# Patient Record
Sex: FEMALE | Race: BLACK OR AFRICAN AMERICAN
[De-identification: names, ages, dates, MRNs, and addresses within clinical notes are randomized per-mention and may not be internally consistent; named-entity substitution may affect disease eponyms.]

---

## 2018-08-21 ENCOUNTER — HOSPITAL ENCOUNTER (EMERGENCY)
Dept: HOSPITAL 62 - ER | Age: 73
Discharge: HOME | End: 2018-08-21
Payer: COMMERCIAL

## 2018-08-21 VITALS — DIASTOLIC BLOOD PRESSURE: 73 MMHG | SYSTOLIC BLOOD PRESSURE: 146 MMHG

## 2018-08-21 DIAGNOSIS — I10: ICD-10-CM

## 2018-08-21 DIAGNOSIS — Z79.899: ICD-10-CM

## 2018-08-21 DIAGNOSIS — H93.13: Primary | ICD-10-CM

## 2018-08-21 DIAGNOSIS — R09.81: ICD-10-CM

## 2018-08-21 DIAGNOSIS — J34.89: ICD-10-CM

## 2018-08-21 DIAGNOSIS — R09.89: ICD-10-CM

## 2018-08-21 PROCEDURE — 99284 EMERGENCY DEPT VISIT MOD MDM: CPT

## 2018-08-21 PROCEDURE — 70450 CT HEAD/BRAIN W/O DYE: CPT

## 2018-08-21 NOTE — RADIOLOGY REPORT (SQ)
EXAM DESCRIPTION:  CT HEAD WITHOUT



COMPLETED DATE/TIME:  8/21/2018 6:15 pm



REASON FOR STUDY:  34; congestion, tinnitus



COMPARISON:  None.



TECHNIQUE:  Axial images acquired through the brain without intravenous contrast.  Images reviewed wi
th bone, brain and subdural windows.  Additional sagittal and coronal reconstructions were generated.
 Images stored on PACS.

All CT scanners at this facility use dose modulation, iterative reconstruction, and/or weight based d
osing when appropriate to reduce radiation dose to as low as reasonably achievable (ALARA).

[facility]: Dose Right  [facility]: CareDose    [facility]: Dose Right    [facility]: Teradose 4D    [facility]: Smart Save22



RADIATION DOSE:  CT Rad equipment meets quality standard of care and radiation dose reduction techniq
ues were employed. CTDIvol: 53.2 mGy. DLP: 937 mGy-cm. mGy.



LIMITATIONS:  None.



FINDINGS:  VENTRICLES: Normal size and contour.

CEREBRUM: No masses.  No hemorrhage.  No midline shift.  No evidence for acute infarction. Normal gra
y/white matter differentiation. No areas of low density in the white matter.

CEREBELLUM: No masses.  No hemorrhage.  No alteration of density.  No evidence for acute infarction.

EXTRAAXIAL SPACES: No fluid collections.  No masses.

ORBITS AND GLOBE: No intra- or extraconal masses.  Normal contour of globe without masses.

CALVARIUM: No fracture.

PARANASAL SINUSES: No fluid or mucosal thickening.

SOFT TISSUES: No mass or hematoma.

OTHER: No other significant finding.



IMPRESSION:  NORMAL BRAIN CT WITHOUT CONTRAST.

EVIDENCE OF ACUTE STROKE: NO.



COMMENT:  Quality ID # [id number]: Final reports with documentation of one or more dose reduction techniques
 (e.g., Automated exposure control, adjustment of the mA and/or kV according to patient size, use of 
iterative reconstruction technique)



TECHNICAL DOCUMENTATION:  JOB ID:  [id number]

 2011 [employer]- All Rights Reserved



Reading location - IP/workstation name: [email or web address]

## 2018-08-21 NOTE — ER DOCUMENT REPORT
ED General





- General


Chief Complaint: Ear Pain


Stated Complaint: RINGING IN EARS


Time Seen by Provider: 08/21/18 16:45


Information source: Patient


Notes: 





Patient is a 73-year-old female that presents today stating for around 3 weeks 

she has had some bilateral tinnitus in both ears.  She denies any headache, 

blurry vision, neck pain, weakness or numbness of the arms, gait disturbance, 

palpitations, or chest pain.  Patient does states she has had some intermittent 

runny nose and congestion.  She denies any fevers.  She denies any head trauma.

  She denies any new or change in medications.  


TRAVEL OUTSIDE OF THE [place] IN LAST 30 DAYS: No





- HPI


Onset: Other - See above


Onset/Duration: Gradual


Quality of pain: No pain


Severity: Mild


Pain Level: 1


Associated symptoms: Other - See above


Exacerbated by: Denies


Relieved by: Denies


Similar symptoms previously: No


Recently seen / treated by doctor: No





- Related Data


Allergies/Adverse Reactions: 


 





No Known Allergies Allergy (Verified 11/23/15 08:31)


 











Past Medical History





- General


Information source: Patient





- Social History


Smoking Status: Unknown if Ever Smoked


Cigarette use (# per day): No


Chew tobacco use (# tins/day): No


Smoking Education Provided: No


Frequency of alcohol use: None


Family History: Other


Patient has suicidal ideation: No


Patient has homicidal ideation: No





- Past Medical History


Cardiac Medical History: Reports: Hx Hypertension - 1996 meds/HT crisis 1990


   Denies: Hx Coronary Artery Disease, Hx Heart Attack


Pulmonary Medical History: Reports: Hx Bronchitis - 2 yrs ago


   Denies: Hx Asthma, Hx COPD, Hx Pneumonia


Neurological Medical History: Denies: Hx Cerebrovascular Accident, Hx Seizures


Renal/ Medical History: Denies: Hx Peritoneal Dialysis


GI Medical History: Denies: Hx Hepatitis, Hx Hiatal Hernia, Hx Ulcer


Musculoskeletal Medical History: Reports Hx Arthritis - osteo/legs


Infectious Medical History: Denies: Hx Hepatitis


Past Surgical History: Reports: Hx Cholecystectomy, Hx Hysterectomy.  Denies: 

Hx Mastectomy, Hx Open Heart Surgery, Hx Pacemaker





- Immunizations


Hx Diphtheria, Pertussis, Tetanus Vaccination: Yes


Hx Pneumococcal Vaccination: 01/01/12





Review of Systems





- Review of Systems


Constitutional: denies: Fever


EENT: Nose congestion, Nose discharge.  denies: Eye discharge


Cardiovascular: denies: Chest pain, Palpitations


Respiratory: denies: Short of breath


Gastrointestinal: denies: Vomiting


Genitourinary: denies: Dysuria


Musculoskeletal: denies: Leg swelling


Skin: Other - no hives.  denies: Rash


Neurological/Psychological: Other - no slurred speech


-: Yes All other systems reviewed and negative





Physical Exam





- Vital signs


Vitals: 


 











Temp Pulse Resp BP Pulse Ox


 


 98.7 F   58 L  16   146/73 H  98 


 


 08/21/18 15:49  08/21/18 15:49  08/21/18 15:49  08/21/18 15:49  08/21/18 15:49











Notes: 





Reviewed vital signs and nursing note as charted by RN.





CONSTITUTIONAL: Alert and oriented and responds appropriately to questions. Well

-appearing; well-nourished





HEAD: Normocephalic; atraumatic





EYES: PERRL; full extraocular range of motion





ENT: Normal nose; bilateral nonpurulent nasal rhinorrhea; no sinus swelling, 

erythema, bogginess; patient has small effusion behind bilateral ears with no 

obvious signs of bulging, erythema or infection; moist mucous membranes; 

pharynx without lesions noted





NECK: Supple without meningismus; non-tender; no carotid bruit; no cervical 

lymphadenopathy, no masses





CARD: Regular rate and rhythm; no murmurs; symmetric distal pulses





RESP: Normal chest excursion without splinting or tachypnea; breath sounds 

clear and equal bilaterally





ABD/GI: Normal bowel sounds; non-distended; soft, non-tender





EXT: No edema





SKIN: No acute lesions noted





NEURO: CN II through XII are intact.  Patient has 5 out of 5 bilateral upper 

and lower sensation intact to light touch.  Normal cerebellar examination





PSYCH: The patient's mood and manner are appropriate. Grooming and personal 

hygiene are appropriate.





Course





- Re-evaluation


Re-evalutation: 





08/21/18 16:59


Given the above history and physical examination I will order a noncontrast CT 

scan of the head.  I do believe acute bacterial meningitis, acute CVA, acute 

intracranial bleed, all to be extremely unlikely.  Patient denies eating a lot 

of aspirin tablets.  





Given the frequent nasal congestion with some mild tinnitus with no focal 

neurological deficits, if the image is unremarkable, I would discharge the 

patient home with strict return precautions and follow-up with ENT.





08/21/18 18:32


CT scan of the head shows no obvious acute abnormalities.  Given the above 

history and physical examination, patient will be discharged home with strict 

return precautions and follow-up with primary care physician and ENT.











- Vital Signs


Vital signs: 


 











Temp Pulse Resp BP Pulse Ox


 


 98.7 F   58 L  16   146/73 H  98 


 


 08/21/18 15:49  08/21/18 15:49  08/21/18 15:49  08/21/18 15:49  08/21/18 15:49














Discharge





- Discharge


Clinical Impression: 


 Rhinorrhea





Tinnitus


Qualifiers:


 Laterality: bilateral Qualified Code(s): H93.13 - Tinnitus, bilateral





Condition: Good


Disposition: HOME, SELF-CARE


Additional Instructions: 


Come back immediately for any increased ringing in her ear, fever, vomiting, 

weakness or numbness, blurry vision, headaches, or any other acute problems.  

Please follow-up with your primary care physician and hopefully ENT as we have 

discussed.


Referrals: 


HOLGER SKAGGS MD [Primary Care Provider] - Follow up as needed


JOSEPHINE RONDON DO [ASSOCIATE] - Follow up as needed

## 2018-12-13 ENCOUNTER — HOSPITAL ENCOUNTER (OUTPATIENT)
Dept: HOSPITAL 62 - WI | Age: 73
End: 2018-12-13
Attending: INTERNAL MEDICINE
Payer: COMMERCIAL

## 2018-12-13 DIAGNOSIS — Z12.31: Primary | ICD-10-CM

## 2018-12-13 PROCEDURE — 77063 BREAST TOMOSYNTHESIS BI: CPT

## 2018-12-13 PROCEDURE — 77067 SCR MAMMO BI INCL CAD: CPT

## 2018-12-13 NOTE — WOMENS IMAGING REPORT
EXAM DESCRIPTION:  3D SCREENING MAMMO BILAT



COMPLETED DATE/TIME:  12/13/2018 1:21 pm



REASON FOR STUDY:  SCREENING MAMMO Z12.31  ENCNTR SCREEN MAMMOGRAM FOR MALIGNANT NEOPLASM OF FLORES



COMPARISON:  [id number]



TECHNIQUE:  Standard craniocaudal and mediolateral oblique views of each breast recorded using digita
l acquisition and breast tomosynthesis.



LIMITATIONS:  None.



FINDINGS:  RIGHT BREAST

MASSES: No suspicious masses.

CALCIFICATIONS: No new or suspicious calcifications.

ARCHITECTURAL DISTORTION: Developing density with architectural distortion lateral subareolar right b
reast 2 cm deep to the nipple.

DEVELOPING DENSITY: See above.

ASYMMETRY: None noted.

OTHER: No other significant findings.

LEFT BREAST

MASSES: No suspicious masses.

CALCIFICATIONS: No new or suspicious calcifications.

ARCHITECTURAL DISTORTION: None.

DEVELOPING DENSITY: None.

ASYMMETRY: None noted.

OTHER: No other significant findings.

Read with the assistance of CAD.

.[facility] - R2 Cenova Version 1.3

.[facility] - R2 Cenova Version 1.3

.[facility] - R2 Cenova Version 2.4

.[facility] - R2 Cenova Version 2.4

.[facility] - R2  Version 9.2



IMPRESSION:  Developing density right breast.



BREAST DENSITY:  b. There are scattered areas of fibroglandular density.



BIRAD:  0 Incomplete:  Needs Additional Imaging Evaluation and/or prior Mammograms for Comparison.



RECOMMENDATION:  RECOMMENDED FOLLOW-UP: True lateral cone compression views and potential ultrasound 
of the right breast.

The patient will be contacted for additional imaging.



COMMENT:  The patient has been notified of the results by letter per SA requirements. Additional no
tification policies are in place for contacting patient with suspicious or incomplete findings.

Quality ID #225: The American College of Radiology recommends an annual screening mammogram for women
 aged 40 years or over. This facility utilizes a reminder system to ensure that all patients receive 
reminder letters, and/or direct phone calls for appointments. This includes reminders for routine scr
eening mammograms, diagnostic mammograms, or other Breast Imaging Interventions when appropriate.  Th
is patient will be placed in the appropriate reminder system.

The American College of Radiology (ACR) has developed recommendations for screening MRI of the breast
s in certain patient populations, to be used in conjunction with mammography.  Breast MRI surveillanc
e may be appropriate for women with more than 20% lifetime risk of developing breast cancer  as deter
mined by genetic testing, significant family history of the disease, or history of mantle radiation f
or Hodgkins Disease.  ACR Practice Guidelines 2008.

[employer]



PQRS 6045F: Fluoroscopic imaging is not utilized for breast tomosynthesis.



TECHNICAL DOCUMENTATION:  FINDING NUMBER: (1)

ASSESSMENT: (1)

JOB ID:  [id number]

 2011 [employer]- All Rights Reserved



Reading location - IP/workstation name: [facility]

## 2018-12-28 ENCOUNTER — HOSPITAL ENCOUNTER (OUTPATIENT)
Dept: HOSPITAL 62 - WI | Age: 73
End: 2018-12-28
Attending: INTERNAL MEDICINE
Payer: COMMERCIAL

## 2018-12-28 DIAGNOSIS — N60.01: Primary | ICD-10-CM

## 2018-12-28 PROCEDURE — 76642 ULTRASOUND BREAST LIMITED: CPT

## 2018-12-28 NOTE — WOMENS IMAGING REPORT
EXAM DESCRIPTION:  RIGHT DIAGNOSTIC MAMMO W/CAD; U/S BREAST UNILAT LIMITED



COMPLETED DATE/TIME:  12/28/2018 8:02 am; 12/28/2018 8:42 am



REASON FOR STUDY:  INCONCLUSSIVE MAMMOGRAM;R92.2; RT BREAST R92.2 R92.2  INCONCLUSIVE MAMMOGRAM



COMPARISON:  12/13/2018



TECHNIQUE:  True lateral and cone compression views.



LIMITATIONS:  None.



FINDINGS:  BREAST: right

MASSES: No suspicious masses.

CALCIFICATIONS: No new or suspicious calcifications.

ARCHITECTURAL DISTORTION: None.

DEVELOPING DENSITY: None.

ASYMMETRY: None noted.

OTHER: No other significant findings.

Ultrasound of the right breast demonstrates 6 mm cyst subareolar 9 o'clock.



IMPRESSION:  Benign findings.



BREAST DENSITY:  b. There are scattered areas of fibroglandular density.



BIRAD:  2 Benign findings.



RECOMMENDATION:  RECOMMENDED FOLLOW UP: Birads 1 or 2: The patient should resume routine screening .

SPECIFIC INTERVENTION/IMAGING/CONSULTATION RECOMMENDED:No additional intervention/ imaging/consultati
on needed at this time.

COMMUNICATION:The imaging findings were not discussed with the patient. Her referring provider has be
en notified of the findings.



COMMENT:  The patient has been notified of the results by letter per SA requirements. Additional no
tification policies are in place for contacting patient with suspicious or incomplete findings.

Quality ID #225: The American College of Radiology recommends an annual screening mammogram for women
 aged 40 years or over. This facility utilizes a reminder system to ensure that all patients receive 
reminder letters, and/or direct phone calls for appointments. This includes reminders for routine scr
eening mammograms, diagnostic mammograms, or other Breast Imaging Interventions when appropriate.  Th
is patient will be placed in the appropriate reminder system.

The American College of Radiology (ACR) has developed recommendations for screening MRI of the breast
s in certain patient populations, to be used in conjunction with mammography.  Breast MRI surveillanc
e may be appropriate for women with more than 20% lifetime risk of developing breast cancer  as deter
mined by genetic testing, significant family history of the disease, or history of mantle radiation f
or Hodgkins Disease.  ACR Practice Guidelines 2008.



TECHNICAL DOCUMENTATION:  FINDING NUMBER: (1)

ASSESSMENT: (1)

JOB ID:  [id number]

 2011 [employer] All Rights Reserved



Reading location - IP/workstation name: [email or web address] [id number]

## 2018-12-28 NOTE — WOMENS IMAGING REPORT
EXAM DESCRIPTION:  RIGHT DIAGNOSTIC MAMMO W/CAD; U/S BREAST UNILAT LIMITED



COMPLETED DATE/TIME:  12/28/2018 8:02 am; 12/28/2018 8:42 am



REASON FOR STUDY:  INCONCLUSSIVE MAMMOGRAM;R92.2; RT BREAST R92.2 R92.2  INCONCLUSIVE MAMMOGRAM



COMPARISON:  12/13/2018



TECHNIQUE:  True lateral and cone compression views.



LIMITATIONS:  None.



FINDINGS:  BREAST: right

MASSES: No suspicious masses.

CALCIFICATIONS: No new or suspicious calcifications.

ARCHITECTURAL DISTORTION: None.

DEVELOPING DENSITY: None.

ASYMMETRY: None noted.

OTHER: No other significant findings.

Ultrasound of the right breast demonstrates 6 mm cyst subareolar 9 o'clock.



IMPRESSION:  Benign findings.



BREAST DENSITY:  b. There are scattered areas of fibroglandular density.



BIRAD:  2 Benign findings.



RECOMMENDATION:  RECOMMENDED FOLLOW UP: Birads 1 or 2: The patient should resume routine screening .

SPECIFIC INTERVENTION/IMAGING/CONSULTATION RECOMMENDED:No additional intervention/ imaging/consultati
on needed at this time.

COMMUNICATION:The imaging findings were not discussed with the patient. Her referring provider has be
en notified of the findings.



COMMENT:  The patient has been notified of the results by letter per SA requirements. Additional no
tification policies are in place for contacting patient with suspicious or incomplete findings.

Quality ID #225: The American College of Radiology recommends an annual screening mammogram for women
 aged 40 years or over. This facility utilizes a reminder system to ensure that all patients receive 
reminder letters, and/or direct phone calls for appointments. This includes reminders for routine scr
eening mammograms, diagnostic mammograms, or other Breast Imaging Interventions when appropriate.  Th
is patient will be placed in the appropriate reminder system.

The American College of Radiology (ACR) has developed recommendations for screening MRI of the breast
s in certain patient populations, to be used in conjunction with mammography.  Breast MRI surveillanc
e may be appropriate for women with more than 20% lifetime risk of developing breast cancer  as deter
mined by genetic testing, significant family history of the disease, or history of mantle radiation f
or Hodgkins Disease.  ACR Practice Guidelines 2008.



TECHNICAL DOCUMENTATION:  FINDING NUMBER: (1)

ASSESSMENT: (1)

JOB ID:  [id number]

 2011 [employer]- All Rights Reserved



Reading location - IP/workstation name: [email or web address] [id number]

## 2020-01-15 ENCOUNTER — HOSPITAL ENCOUNTER (OUTPATIENT)
Dept: HOSPITAL 62 - WI | Age: 75
End: 2020-01-15
Attending: INTERNAL MEDICINE
Payer: COMMERCIAL

## 2020-01-15 ENCOUNTER — HOSPITAL ENCOUNTER (OUTPATIENT)
Dept: HOSPITAL 62 - OD | Age: 75
End: 2020-01-15
Attending: INTERNAL MEDICINE
Payer: COMMERCIAL

## 2020-01-15 DIAGNOSIS — Z12.31: Primary | ICD-10-CM

## 2020-01-15 DIAGNOSIS — N18.3: ICD-10-CM

## 2020-01-15 DIAGNOSIS — I12.9: Primary | ICD-10-CM

## 2020-01-15 LAB
ADD MANUAL DIFF: NO
ALBUMIN SERPL-MCNC: 4.1 G/DL (ref 3.5–5)
ALP SERPL-CCNC: 51 U/L (ref 38–126)
ANION GAP SERPL CALC-SCNC: 10 MMOL/L (ref 5–19)
APPEARANCE UR: (no result)
APTT PPP: YELLOW S
AST SERPL-CCNC: 21 U/L (ref 14–36)
BASOPHILS # BLD AUTO: 0 10^3/UL (ref 0–0.2)
BASOPHILS NFR BLD AUTO: 0.4 % (ref 0–2)
BILIRUB DIRECT SERPL-MCNC: 0.4 MG/DL (ref 0–0.4)
BILIRUB SERPL-MCNC: 0.6 MG/DL (ref 0.2–1.3)
BILIRUB UR QL STRIP: NEGATIVE
BUN SERPL-MCNC: 34 MG/DL (ref 7–20)
CALCIUM: 9.4 MG/DL (ref 8.4–10.2)
CHLORIDE SERPL-SCNC: 98 MMOL/L (ref 98–107)
CHOLEST SERPL-MCNC: 278.83 MG/DL (ref 0–200)
CO2 SERPL-SCNC: 32 MMOL/L (ref 22–30)
EOSINOPHIL # BLD AUTO: 0.3 10^3/UL (ref 0–0.6)
EOSINOPHIL NFR BLD AUTO: 4.6 % (ref 0–6)
ERYTHROCYTE [DISTWIDTH] IN BLOOD BY AUTOMATED COUNT: 13.1 % (ref 11.5–14)
GLUCOSE SERPL-MCNC: 105 MG/DL (ref 75–110)
GLUCOSE UR STRIP-MCNC: NEGATIVE MG/DL
HCT VFR BLD CALC: 38.8 % (ref 36–47)
HGB BLD-MCNC: 13.3 G/DL (ref 12–15.5)
KETONES UR STRIP-MCNC: NEGATIVE MG/DL
LDLC SERPL DIRECT ASSAY-MCNC: 198 MG/DL (ref ?–100)
LYMPHOCYTES # BLD AUTO: 1.8 10^3/UL (ref 0.5–4.7)
LYMPHOCYTES NFR BLD AUTO: 27.1 % (ref 13–45)
MCH RBC QN AUTO: 31.3 PG (ref 27–33.4)
MCHC RBC AUTO-ENTMCNC: 34.3 G/DL (ref 32–36)
MCV RBC AUTO: 91 FL (ref 80–97)
MONOCYTES # BLD AUTO: 0.7 10^3/UL (ref 0.1–1.4)
MONOCYTES NFR BLD AUTO: 10.6 % (ref 3–13)
NEUTROPHILS # BLD AUTO: 3.7 10^3/UL (ref 1.7–8.2)
NEUTS SEG NFR BLD AUTO: 57.3 % (ref 42–78)
NITRITE UR QL STRIP: NEGATIVE
PH UR STRIP: 5 [PH] (ref 5–9)
PLATELET # BLD: 307 10^3/UL (ref 150–450)
POTASSIUM SERPL-SCNC: 3.6 MMOL/L (ref 3.6–5)
PROT SERPL-MCNC: 7.5 G/DL (ref 6.3–8.2)
PROT UR STRIP-MCNC: 30 MG/DL
RBC # BLD AUTO: 4.25 10^6/UL (ref 3.72–5.28)
SP GR UR STRIP: 1.02
TOTAL CELLS COUNTED % (AUTO): 100 %
TRIGL SERPL-MCNC: 187 MG/DL (ref ?–150)
URATE SERPL-MCNC: 10.1 MG/DL (ref 2.5–7.5)
UROBILINOGEN UR-MCNC: NEGATIVE MG/DL (ref ?–2)
VLDLC SERPL CALC-MCNC: 37.4 MG/DL (ref 10–31)
WBC # BLD AUTO: 6.5 10^3/UL (ref 4–10.5)

## 2020-01-15 PROCEDURE — 82043 UR ALBUMIN QUANTITATIVE: CPT

## 2020-01-15 PROCEDURE — 82570 ASSAY OF URINE CREATININE: CPT

## 2020-01-15 PROCEDURE — 86320 SERUM IMMUNOELECTROPHORESIS: CPT

## 2020-01-15 PROCEDURE — 36415 COLL VENOUS BLD VENIPUNCTURE: CPT

## 2020-01-15 PROCEDURE — 80053 COMPREHEN METABOLIC PANEL: CPT

## 2020-01-15 PROCEDURE — 84436 ASSAY OF TOTAL THYROXINE: CPT

## 2020-01-15 PROCEDURE — 77063 BREAST TOMOSYNTHESIS BI: CPT

## 2020-01-15 PROCEDURE — 84156 ASSAY OF PROTEIN URINE: CPT

## 2020-01-15 PROCEDURE — 84550 ASSAY OF BLOOD/URIC ACID: CPT

## 2020-01-15 PROCEDURE — 84166 PROTEIN E-PHORESIS/URINE/CSF: CPT

## 2020-01-15 PROCEDURE — 80061 LIPID PANEL: CPT

## 2020-01-15 PROCEDURE — 81001 URINALYSIS AUTO W/SCOPE: CPT

## 2020-01-15 PROCEDURE — 77067 SCR MAMMO BI INCL CAD: CPT

## 2020-01-15 PROCEDURE — 85025 COMPLETE CBC W/AUTO DIFF WBC: CPT

## 2020-01-15 PROCEDURE — 84443 ASSAY THYROID STIM HORMONE: CPT

## 2020-01-15 NOTE — WOMENS IMAGING REPORT
EXAM DESCRIPTION:  3D SCREENING MAMMO BILAT



COMPLETED DATE/TIME:  1/15/2020 9:12 am



REASON FOR STUDY:  Z12.31 SCREENING MAMMO Z12.31  ENCNTR SCREEN MAMMOGRAM FOR MALIGNANT NEOPLASM OF B
RE



COMPARISON:  Multiple since 2009



EXAM PARAMETERS:  Views: Standard craniocaudal and mediolateral oblique views of each breast recorded
 using digital acquisition and breast tomosynthesis.

Read with the assistance of CAD.

.[facility] [employer]  Version 9.2



LIMITATIONS:  None.



FINDINGS:  No suspicious masses, suspicious calcifications or architectural distortion. No areas of c
oncern.



IMPRESSION:   NEGATIVE MAMMOGRAM. BIRADS 1.



BREAST DENSITY:  c. The breasts are heterogeneously dense, which may obscure small masses.



BIRAD:  ASSESSMENT:  1 NEGATIVE



RECOMMENDATION:  ROUTINE SCREENING

Please continue yearly bilateral screening mammography/tomosynthesis in January 2021



COMMENT:  The patient has been notified of the results by letter per MQSA requirements. Additional no
tification policies are in place for contacting patient with suspicious or incomplete findings.

Quality ID #225: The American College of Radiology recommends an annual screening mammogram for women
 aged 40 years or over. This facility utilizes a reminder system to ensure that all patients receive 
reminder letters, and/or direct phone calls for appointments. This includes reminders for routine scr
eening mammograms, diagnostic mammograms, or other Breast Imaging Interventions when appropriate.  Th
is patient will be placed in the appropriate reminder system.



TECHNICAL DOCUMENTATION:  FINDING NUMBER: (1)

ASSESSMENT:  (1)

JOB ID:  [id number]

 2011 [employer]- All Rights Reserved



Reading location - IP/workstation name: [email or web address]

## 2020-01-16 LAB
CREAT UR-MCNC: 152.1 MG/DL
MICROALBUMIN UR-MCNC: 58.1 UG/ML

## 2020-01-17 LAB
CREAT UR-MCNC: 176.1 MG/DL (ref 15–278)
PROT UR STRIP-MCNC: 10.8 MG/DL (ref ?–12)
UR PRO/CREAT RATIO RESULT: 0.1 MG/MG (ref 0–0.2)

## 2020-01-18 ENCOUNTER — HOSPITAL ENCOUNTER (EMERGENCY)
Dept: HOSPITAL 62 - ER | Age: 75
Discharge: HOME | End: 2020-01-18
Payer: COMMERCIAL

## 2020-01-18 VITALS — DIASTOLIC BLOOD PRESSURE: 74 MMHG | SYSTOLIC BLOOD PRESSURE: 142 MMHG

## 2020-01-18 DIAGNOSIS — Z90.710: ICD-10-CM

## 2020-01-18 DIAGNOSIS — Z90.49: ICD-10-CM

## 2020-01-18 DIAGNOSIS — M79.672: Primary | ICD-10-CM

## 2020-01-18 DIAGNOSIS — I10: ICD-10-CM

## 2020-01-18 DIAGNOSIS — M10.9: ICD-10-CM

## 2020-01-18 PROCEDURE — 99283 EMERGENCY DEPT VISIT LOW MDM: CPT

## 2020-01-18 NOTE — ER DOCUMENT REPORT
HPI





- HPI


Patient complains to provider of: Left foot pain


Time Seen by Provider: 01/18/20 12:32


Onset: Yesterday


Onset/Duration: Gradual


Quality of pain: Achy


Pain Level: 5


Context: 





Patient presents complaining of left foot pain that started yesterday.  Patient 

denies any injury.  Patient does work long shifts which requires standing.


Associated Symptoms: denies: Fever


Exacerbated by: Standing, Movement, Walking


Relieved by: Denies


Similar symptoms previously: No


Recently seen / treated by doctor: No





- ROS


ROS below otherwise negative: Yes


Systems Reviewed and Negative: Yes All other systems reviewed and negative





- CONSTITUTIONAL


Constitutional: DENIES: Fever, Chills





- GASTROINTESTINAL


Gastrointestinal: DENIES: Nausea





- REPRODUCTIVE


Reproductive: DENIES: Pregnant:





- MUSCULOSKELETAL


Musculoskeletal: REPORTS: Extremity pain, Swelling





- DERM


Skin Color: Normal


Skin Problems: None





Past Medical History





- General


Information source: Patient





- Social History


Smoking Status: Never Smoker


Chew tobacco use (# tins/day): No


Frequency of alcohol use: None


Drug Abuse: None


Occupation: Retail


Family History: Reviewed & Not Pertinent, Other


Patient has suicidal ideation: No


Patient has homicidal ideation: No





- Past Medical History


Cardiac Medical History: Reports: Hx Hypertension - 1996 meds/HT crisis 1990


Pulmonary Medical History: Reports: Hx Bronchitis - 2 yrs ago


Neurological Medical History: Denies: Hx Cerebrovascular Accident, Hx Seizures


Endocrine Medical History: Denies: Hx Diabetes Mellitus Type 1, Hx Diabetes 

Mellitus Type 2


Renal/ Medical History: Denies: Hx Peritoneal Dialysis


GI Medical History: Denies: Hx Hepatitis, Hx Hiatal Hernia, Hx Ulcer


Musculoskeletal Medical History: Reports Hx Arthritis - osteo/legs, Reports Hx 

Gout


Infectious Medical History: Denies: Hx Hepatitis


Past Surgical History: Reports: Hx Cholecystectomy, Hx Hysterectomy





- Immunizations


Hx Diphtheria, Pertussis, Tetanus Vaccination: Yes


Hx Pneumococcal Vaccination: 01/01/12





Vertical Provider Document





- CONSTITUTIONAL


Agree With Documented VS: Yes


Exam Limitations: No Limitations


General Appearance: WD/WN, No Apparent Distress





- INFECTION CONTROL


TRAVEL OUTSIDE OF THE [place] IN LAST 30 DAYS: No





- HEENT


HEENT: Atraumatic, Normocephalic





- NECK


Neck: Normal Inspection, Supple.  negative: Lymphadenopathy-Left, 

Lymphadenopathy-Right





- RESPIRATORY


Respiratory: Breath Sounds Normal, No Respiratory Distress





- CARDIOVASCULAR


Cardiovascular: Regular Rate, Regular Rhythm


Pulses: Normal: Dorsalis pedis





- MUSCULOSKELETAL/EXTREMETIES


Musculoskeletal/Extremeties: MAEW, Tender - left midfoot tenderness with mild 

calor, no obvious erythema, Edema - 1+





- NEURO


Level of Consciousness: Awake, Alert, Appropriate


Motor/Sensory: No Motor Deficit





- DERM


Integumentary: Warm, Dry, No Rash





Course





- Re-evaluation


Re-evalutation: 





01/18/20 14:01


Patient's x-ray report reviewed, no concern for any fracture or occult injury.  

Patient with mild calor and swelling to left midfoot area.  Area is tender with 

light palpation.  Patient with symptoms worrisome for early gout flareup.  

Patient educated on low purine diet.  Good return precautions discussed with 

patient.


01/18/20 14:02








- Vital Signs


Vital signs: 


                                        











Temp Pulse Resp BP Pulse Ox


 


 98.0 F   68   18   146/76 H  98 


 


 01/18/20 12:22  01/18/20 12:22  01/18/20 12:22  01/18/20 12:22  01/18/20 12:22














- Diagnostic Test


Radiology reviewed: Image reviewed, Reports reviewed





Discharge





- Discharge


Clinical Impression: 


 Left foot pain





Gout


Qualifiers:


 Gout site: unspecified site Gout etiology: unspecified cause Chronicity: acute 

Qualified Code(s): M10.9 - Gout, unspecified





Condition: Stable


Disposition: HOME, SELF-CARE


Instructions:  Gout (OM), Gout Diet (OM), Steroid Medication, Ultram (OM)


Additional Instructions: 


Return immediately for any new or worsening symptoms





Followup with your primary care provider, call tomorrow to make a followup 

appointment





Eat foods low in purine


Prescriptions: 


Prednisone [Deltasone 20 mg Tablet] 2 tab PO DAILY 5 Days  tablet


Walker [Folding Walker] 1 each MC ASDIR PRN #1 each


 PRN Reason: 


Tramadol HCl [Ultram 50 mg Tablet] 50 mg PO ASDIR PRN #15 tablet


 PRN Reason: 


Forms:  Return to Work


Referrals: 


HOLGER SKAGGS MD [Primary Care Provider] - Follow up as needed

## 2020-01-18 NOTE — RADIOLOGY REPORT (SQ)
EXAM DESCRIPTION:  FOOT LEFT COMPLETE



COMPLETED DATE/TIME:  1/18/2020 12:55 pm



REASON FOR STUDY:  left foot pain   .  Pain on top of the left foot.



COMPARISON:  None.



NUMBER OF VIEWS:  Three views.



TECHNIQUE:  AP, lateral and oblique  radiographic images acquired of the left foot.



LIMITATIONS:  None.



FINDINGS:  MINERALIZATION: Normal.

BONES: No acute fracture or dislocation. Bony spur noted at the plantar aspect of the calcaneus.  Deg
enerative changes at the midfoot.  Degenerative changes are also noted at the 1st metatarso-phalangea
l joint.

SOFT TISSUES: No soft tissue swelling.  No radiopaque foreign body.



IMPRESSION:  No radiographic evidence for acute fracture at the left foot.  Degenerative changes.



TECHNICAL DOCUMENTATION:  JOB ID:  [id number]

[id number]

 2011 [employer]- All Rights Reserved



Reading location - IP/workstation name: [email or web address]

## 2020-01-20 LAB
ALBUMIN 3: 3.6 G/DL (ref 2.9–4.4)
ALBUMIN UR: 33.5 %
ALBUMIN/GLOB SERPL: 1.2 {RATIO} (ref 0.7–1.7)
ALPHA-1-GLOBULIN URINE: 4.6 %
ALPHA-2-GLOBULIN URINE: 12.7 %
ALPHA1 GLOB SERPL ELPH-MCNC: 0.2 G/DL (ref 0–0.4)
B-GLOBULIN SERPL ELPH-MCNC: 1.1 G/DL (ref 0.7–1.3)
GAMMA GLOB 24H MFR UR ELPH: 18 %
GAMMA GLOBULINS: 1.1 G/DL (ref 0.4–1.8)
IGA SERPL-MCNC: 255 MG/DL (ref 64–422)
IGG SERPL-MCNC: 1301 MG/DL (ref 700–1600)
IGM SERPL-MCNC: 88 MG/DL (ref 26–217)
PROT SERPL-MCNC: 6.7 G/DL (ref 6–8.5)
PROTEIN TOTAL URINE: 10.3 MG/DL

## 2020-05-30 ENCOUNTER — HOSPITAL ENCOUNTER (EMERGENCY)
Dept: HOSPITAL 62 - ER | Age: 75
Discharge: HOME | End: 2020-05-30
Payer: COMMERCIAL

## 2020-05-30 VITALS — DIASTOLIC BLOOD PRESSURE: 81 MMHG | SYSTOLIC BLOOD PRESSURE: 150 MMHG

## 2020-05-30 DIAGNOSIS — M25.461: ICD-10-CM

## 2020-05-30 DIAGNOSIS — M17.11: ICD-10-CM

## 2020-05-30 DIAGNOSIS — N20.1: Primary | ICD-10-CM

## 2020-05-30 DIAGNOSIS — M54.5: ICD-10-CM

## 2020-05-30 DIAGNOSIS — M25.561: ICD-10-CM

## 2020-05-30 DIAGNOSIS — I10: ICD-10-CM

## 2020-05-30 LAB
ADD MANUAL DIFF: NO
ALBUMIN SERPL-MCNC: 4.4 G/DL (ref 3.5–5)
ALP SERPL-CCNC: 54 U/L (ref 38–126)
ANION GAP SERPL CALC-SCNC: 9 MMOL/L (ref 5–19)
APPEARANCE UR: CLEAR
APTT PPP: (no result) S
AST SERPL-CCNC: 22 U/L (ref 14–36)
BASOPHILS # BLD AUTO: 0.1 10^3/UL (ref 0–0.2)
BASOPHILS NFR BLD AUTO: 1.4 % (ref 0–2)
BILIRUB DIRECT SERPL-MCNC: 0 MG/DL (ref 0–0.4)
BILIRUB SERPL-MCNC: 0.7 MG/DL (ref 0.2–1.3)
BILIRUB UR QL STRIP: NEGATIVE
BUN SERPL-MCNC: 29 MG/DL (ref 7–20)
CALCIUM: 9.6 MG/DL (ref 8.4–10.2)
CHLORIDE SERPL-SCNC: 103 MMOL/L (ref 98–107)
CO2 SERPL-SCNC: 26 MMOL/L (ref 22–30)
EOSINOPHIL # BLD AUTO: 0 10^3/UL (ref 0–0.6)
EOSINOPHIL NFR BLD AUTO: 0.5 % (ref 0–6)
ERYTHROCYTE [DISTWIDTH] IN BLOOD BY AUTOMATED COUNT: 13.7 % (ref 11.5–14)
GLUCOSE SERPL-MCNC: 102 MG/DL (ref 75–110)
GLUCOSE UR STRIP-MCNC: NEGATIVE MG/DL
HCT VFR BLD CALC: 38.1 % (ref 36–47)
HGB BLD-MCNC: 12.7 G/DL (ref 12–15.5)
KETONES UR STRIP-MCNC: NEGATIVE MG/DL
LYMPHOCYTES # BLD AUTO: 1.1 10^3/UL (ref 0.5–4.7)
LYMPHOCYTES NFR BLD AUTO: 14.3 % (ref 13–45)
MCH RBC QN AUTO: 31.3 PG (ref 27–33.4)
MCHC RBC AUTO-ENTMCNC: 33.3 G/DL (ref 32–36)
MCV RBC AUTO: 94 FL (ref 80–97)
MONOCYTES # BLD AUTO: 0.6 10^3/UL (ref 0.1–1.4)
MONOCYTES NFR BLD AUTO: 7.8 % (ref 3–13)
NEUTROPHILS # BLD AUTO: 5.8 10^3/UL (ref 1.7–8.2)
NEUTS SEG NFR BLD AUTO: 76 % (ref 42–78)
NITRITE UR QL STRIP: POSITIVE
PH UR STRIP: 5 [PH] (ref 5–9)
PLATELET # BLD: 249 10^3/UL (ref 150–450)
POTASSIUM SERPL-SCNC: 4.1 MMOL/L (ref 3.6–5)
PROT SERPL-MCNC: 7.6 G/DL (ref 6.3–8.2)
PROT UR STRIP-MCNC: NEGATIVE MG/DL
RBC # BLD AUTO: 4.04 10^6/UL (ref 3.72–5.28)
SP GR UR STRIP: 1.01
TOTAL CELLS COUNTED % (AUTO): 100 %
URATE SERPL-MCNC: 5.3 MG/DL (ref 2.5–7.5)
UROBILINOGEN UR-MCNC: 2 MG/DL (ref ?–2)
WBC # BLD AUTO: 7.6 10^3/UL (ref 4–10.5)

## 2020-05-30 PROCEDURE — 80053 COMPREHEN METABOLIC PANEL: CPT

## 2020-05-30 PROCEDURE — 81001 URINALYSIS AUTO W/SCOPE: CPT

## 2020-05-30 PROCEDURE — 73564 X-RAY EXAM KNEE 4 OR MORE: CPT

## 2020-05-30 PROCEDURE — 74176 CT ABD & PELVIS W/O CONTRAST: CPT

## 2020-05-30 PROCEDURE — 84550 ASSAY OF BLOOD/URIC ACID: CPT

## 2020-05-30 PROCEDURE — 99284 EMERGENCY DEPT VISIT MOD MDM: CPT

## 2020-05-30 PROCEDURE — 96361 HYDRATE IV INFUSION ADD-ON: CPT

## 2020-05-30 PROCEDURE — 85025 COMPLETE CBC W/AUTO DIFF WBC: CPT

## 2020-05-30 PROCEDURE — 96365 THER/PROPH/DIAG IV INF INIT: CPT

## 2020-05-30 PROCEDURE — 36415 COLL VENOUS BLD VENIPUNCTURE: CPT

## 2020-05-30 NOTE — RADIOLOGY REPORT (SQ)
EXAM DESCRIPTION:  CT ABD/PELVIS NO ORAL OR IV



IMAGES COMPLETED DATE/TIME:  5/30/2020 4:33 pm



REASON FOR STUDY:  left flank pain



COMPARISON:  CT abdomen pelvis 2/11/2012



TECHNIQUE:  CT scan of the abdomen and pelvis performed without intravenous or oral contrast. Images 
reviewed with lung, soft tissue, and bone windows. Reconstructed coronal and sagittal MPR images revi
ewed. All images stored on PACS.

All CT scanners at this facility use dose modulation, iterative reconstruction, and/or weight based d
osing when appropriate to reduce radiation dose to as low as reasonably achievable (ALARA).

[facility]: Dose Right  [facility]: CareDose    [facility]: Dose Right    [facility]: Teradose 4D    [facility]: Smart Offermatica



RADIATION DOSE:  CT Rad equipment meets quality standard of care and radiation dose reduction techniq
ues were employed. CTDIvol: 12.1 mGy. DLP: 547 mGy-cm.mGy.



LIMITATIONS:  None.



FINDINGS:  On coronal image 45, a 3 mm calcification is present at the left ureteral pelvic junction 
worrisome for obstructive stone.  No other left-sided urinary calculi.  No left renal cysts or masses
.  No perinephric stranding.

LOWER CHEST: No significant findings. No nodules or infiltrates.

NON-CONTRASTED LIVER, SPLEEN, ADRENALS: Evaluation limited by lack of IV contrast.  Stable 2.2 cm fat
ty density left adrenal nodule, unchanged from 2/11/2012.

PANCREAS: No masses. No peripancreatic inflammatory changes.

GALLBLADDER: Surgically absent.

RIGHT KIDNEY AND URETER: No suspicious masses. Assessment limited by lack of IV contrast.   No signif
icant calcifications.   No hydronephrosis or hydroureter.

LEFT KIDNEY AND URETER: As above

AORTA AND RETROPERITONEUM: No aneurysm. No retroperitoneal masses or adenopathy.

BOWEL AND PERITONEAL CAVITY: No obvious masses or inflammatory changes. No free fluid.  Few colonic d
iverticuli without CT signs of acute diverticulitis

APPENDIX: Normal.

PELVIS, BLADDER, AND ABDOMINAL WALL:No abnormal masses. No free fluid. Bladder normal.  Post hysterec
mahin

BONES: No significant findings.

OTHER: No other significant finding.



IMPRESSION:  3 mm stone at the left ureteral pelvic junction region without perinephric stranding or 
gross hydronephrosis.

No other left-sided urinary stones.

Post cholecystectomy and hysterectomy.  Stable benign left adrenal mass



COMMENT:  Quality ID # 436: Final reports with documentation of one or more dose reduction techniques
 (e.g., Automated exposure control, adjustment of the mA and/or kV according to patient size, use of 
iterative reconstruction technique)



TECHNICAL DOCUMENTATION:  JOB ID:  [id number]

 2011 [employer]- All Rights Reserved



Reading location - IP/workstation name: [email or web address]

## 2020-05-30 NOTE — ER DOCUMENT REPORT
ED General





- General


Chief Complaint: Flank Pain


Stated Complaint: RIGHT KNEE PAIN, SWELLING


Time Seen by Provider: 05/30/20 15:04


Primary Care Provider: 


HOLGER SKAGGS MD [Primary Care Provider] - Follow up as needed


Mode of Arrival: Wheelchair


Notes: 





HPI: 74-year-old female the presents today stating on Tuesday she had 

nontraumatic right knee pain and swelling.  She does not remember overall trauma

but does walk a lot at her job at [employer].  Patient then states 1 day later she 

had a little bit of left lower back pain.  No dysuria, fevers, cough, shortness 

of breath, abdominal pain, or chest pain.  She denies any weakness or numbness 

of the leg.  She has been taking Aleve and icing her right knee.








ROS:  See HPI


All other review of systems reviewed and otherwise negative





Reviewed vital signs and nursing note as charted by RN.





PHYSICAL EXAM: 





CONSTITUTIONAL: Alert and oriented and responds appropriately to questions. 

Well-appearing; well-nourished





HEAD: Normocephalic; atraumatic





ENT: Normal nose; no rhinorrhea; moist mucous membranes; pharynx without lesions

noted





NECK: Supple without meningismus; non-tender; no cervical lymphadenopathy, no 

masses





CARD: Regular rate and rhythm; no murmurs; symmetric distal pulses





RESP: Normal chest excursion without splinting or tachypnea; breath sounds clear

and equal bilaterally; no wheezes, no rhonchi, no rales





ABD/GI: Normal bowel sounds; non-distended; soft, non-tender to deep palpation 

of all 4 quadrants of the abdomen





BACK: The back appears normal and is non-tender to palpation along the midline 

spine.  Patient has some mild left lower back pain below the flank with no 

erythema, swelling, or induration





EXT: Patient has some swelling without any erythema fluctuance around the right 

knee.  She is able to flex to 90 degrees with minimal pain.  Neurovascular 

intact distally.  No calf pain, swelling, or erythematous streaking





SKIN: No acute lesions noted





NEURO: CN 2-12 intact; 5/5 bilateral upper and lower extremity strength with 

sensation intact to light touch





PSYCH: The patient's mood and manner are appropriate. Grooming and personal 

hygiene are appropriate.


TRAVEL OUTSIDE OF THE [place] IN LAST 30 DAYS: No





- Related Data


Allergies/Adverse Reactions: 


                                        





No Known Allergies Allergy (Verified 05/30/20 14:59)


   











Past Medical History





- General


Information source: Patient





- Social History


Smoking Status: Never Smoker


Chew tobacco use (# tins/day): No


Frequency of alcohol use: None


Drug Abuse: None


Family History: Reviewed & Not Pertinent, Other


Patient has homicidal ideation: No





- Past Medical History


Cardiac Medical History: Reports: Hx Hypertension - 1996 meds/HT crisis 1990


   Denies: Hx Heart Attack


Pulmonary Medical History: Reports: Hx Bronchitis - 2 yrs ago


   Denies: Hx Asthma, Hx COPD, Hx Pneumonia


Neurological Medical History: Denies: Hx Cerebrovascular Accident, Hx Seizures


Endocrine Medical History: Denies: Hx Diabetes Mellitus Type 1, Hx Diabetes 

Mellitus Type 2


Renal/ Medical History: Denies: Hx Peritoneal Dialysis


GI Medical History: Denies: Hx Hepatitis, Hx Hiatal Hernia, Hx Ulcer


Musculoskeletal Medical History: Reports Hx Arthritis - osteo/legs, Reports Hx 

Gout


Infectious Medical History: Denies: Hx Hepatitis


Past Surgical History: Reports: Hx Cholecystectomy, Hx Hysterectomy.  Denies: Hx

Mastectomy, Hx Open Heart Surgery





- Immunizations


Hx Diphtheria, Pertussis, Tetanus Vaccination: Yes


Hx Pneumococcal Vaccination: 01/01/12





Physical Exam





- Vital signs


Vitals: 


                                        











Temp Pulse Resp BP Pulse Ox


 


 99.1 F   66   18   166/80 H  97 


 


 05/30/20 14:37  05/30/20 14:37  05/30/20 14:37  05/30/20 14:37  05/30/20 14:37














Course





- Re-evaluation


Re-evalutation: 





Given the above history and physical labs were ordered as well as an x-ray of 

the knee.  CT scan of the abdomen and pelvis was ordered in triage as well as 

urine analysis.  Given the exam as well as the range of motion of the knee I do 

not believe an acute intra-articular infection is likely.  Given the patient's 

age with a lot of walking at her job, I am concerned about the possibility of 

osteoarthritis or possibly a ligamentous tear.  No appreciated ligamentous 

laxity on exam.  Regarding the patient's flank it is below the flank more to the

musculoskeletal region of the lower back.  No midline tenderness or step-offs.





05/30/20 19:03


Labs and imaging as recorded.  X-ray of the right knee shows severe 

osteoarthritis consistent with physical and history.  The patient CT scan shows 

a proximal left kidney stone.  White blood cell count is recorded.  Urine is 

nitrite positive with white blood cells limited as listed.  Negative leukocyte 

esterase.  Patient's pain is much improved.





Given the above history and physical, we will place the patient's right knee in 

an Ace wrap and have the patient follow-up with orthopedics.  Regarding the 

patient's kidney stone I will start the patient on a course of Keflex and 

referral to urologist.  Strict return precautions have been explained regarding 

both conditions.  Patient will receive Rocephin and a small fluid bolus prior to

discharge.  Patient's kidney function is around baseline.





- Vital Signs


Vital signs: 


                                        











Temp Pulse Resp BP Pulse Ox


 


 99.1 F   66   18   166/80 H  97 


 


 05/30/20 15:00  05/30/20 14:37  05/30/20 14:37  05/30/20 14:37  05/30/20 14:37














- Laboratory


Result Diagrams: 


                                 05/30/20 16:05





                                 05/30/20 16:05


Laboratory results interpreted by me: 


                                        











  05/30/20 05/30/20





  16:05 18:12


 


BUN  29 H 


 


Creatinine  1.52 H 


 


Est GFR ( Amer)  41 L 


 


Est GFR (MDRD) Non-Af  34 L 


 


Urine Nitrite   POSITIVE H


 


Urine Urobilinogen   2.0 H














Discharge





- Discharge


Clinical Impression: 


 Kidney stone on left side, Effusion, right knee





Condition: Good


Disposition: HOME, SELF-CARE


Referrals: 


HOLGER SKAGGS MD [Primary Care Provider] - Follow up as needed

## 2020-05-30 NOTE — RADIOLOGY REPORT (SQ)
EXAM DESCRIPTION:  KNEE RIGHT 4 VIEWS



IMAGES COMPLETED DATE/TIME:  5/30/2020 4:23 pm



REASON FOR STUDY:  right knee pain and swelling



COMPARISON:  None.



NUMBER OF VIEWS:  Four views.



TECHNIQUE:  AP, lateral, and both oblique radiographic images acquired of the right knee.



LIMITATIONS:  None.



FINDINGS:  MINERALIZATION: Normal.

BONES: No acute fracture or dislocation.

JOINT: Large suprapatellar knee joint effusion.  Advanced osteoarthritis at the patellofemoral joint 
chondrocalcinosis and bone spur along the upper half of the patella.  Subcortical cysts in the patell
a.

Mild medial and lateral compartment osteoarthritis with joint space narrowing bony spurring and chond
rocalcinosis.

SOFT TISSUES: No soft tissue swelling.  No radio-opaque foreign body.

OTHER: No other significant finding.



IMPRESSION:  Suprapatellar knee joint effusion

Tricompartment osteoarthritis most pronounced at the patellofemoral joint



TECHNICAL DOCUMENTATION:  JOB ID:  [id number]

 2011 [employer]- All Rights Reserved



Reading location - IP/workstation name: [email or web address]

## 2020-05-30 NOTE — ER DOCUMENT REPORT
ED Medical Screen (RME)





- General


Chief Complaint: Flank Pain


Stated Complaint: RIGHT KNEE PAIN, SWELLING


Time Seen by Provider: 05/30/20 15:04


Primary Care Provider: 


HOLGER SKAGGS MD [Primary Care Provider] - Follow up as needed


Mode of Arrival: Wheelchair


Information source: Patient


Notes: 





74-year-old female presented to ED for swelling and pain to the right knee since

Tuesday.  She states she did not call because she thought she could treated 

herself.  She states last night she also started having left flank pain.  She 

states she has never had pain in the area of her kidneys before.  She was 

concerned and that is part of why she came to the hospital today.  She is alert 

oriented respirations regular and unlabored speaking in full sentences.

















I have greeted and performed a rapid initial assessment of this patient.  A 

comprehensive ED assessment and evaluation of the patient, analysis of test 

results and completion of medical decision making process will be conducted by 

an additional ED providers.


TRAVEL OUTSIDE OF THE [place] IN LAST 30 DAYS: No





- Related Data


Allergies/Adverse Reactions: 


                                        





No Known Allergies Allergy (Verified 05/30/20 14:59)


   











Past Medical History





- Social History


Chew tobacco use (# tins/day): No


Frequency of alcohol use: None


Drug Abuse: None





- Past Medical History


Cardiac Medical History: Reports: Hx Hypertension - 1996 meds/HT crisis 1990


   Denies: Hx Heart Attack


Pulmonary Medical History: Reports: Hx Bronchitis - 2 yrs ago


   Denies: Hx Asthma, Hx COPD, Hx Pneumonia


Neurological Medical History: Denies: Hx Cerebrovascular Accident, Hx Seizures


Endocrine Medical History: Denies: Hx Diabetes Mellitus Type 1, Hx Diabetes 

Mellitus Type 2


Renal/ Medical History: Denies: Hx Peritoneal Dialysis


GI Medical History: Denies: Hx Hepatitis, Hx Hiatal Hernia, Hx Ulcer


Musculoskeltal Medical History: Reports Hx Arthritis - osteo/legs, Reports Hx 

Gout


Infectious Medical History: Denies: Hx Hepatitis


Past Surgical History: Reports: Hx Cholecystectomy, Hx Hysterectomy.  Denies: Hx

Mastectomy, Hx Open Heart Surgery





- Immunizations


Hx Diphtheria, Pertussis, Tetanus Vaccination: Yes





Physical Exam





- Vital signs


Vitals: 





                                        











Temp Pulse Resp BP Pulse Ox


 


 99.1 F   66   18   166/80 H  97 


 


 05/30/20 14:37  05/30/20 14:37  05/30/20 14:37  05/30/20 14:37  05/30/20 14:37














Course





- Vital Signs


Vital signs: 





                                        











Temp Pulse Resp BP Pulse Ox


 


 99.1 F   66   18   166/80 H  97 


 


 05/30/20 15:00  05/30/20 14:37  05/30/20 14:37  05/30/20 14:37  05/30/20 14:37














Doctor's Discharge





- Discharge


Referrals: 


HOLGER SKAGGS MD [Primary Care Provider] - Follow up as needed

## 2020-05-31 ENCOUNTER — HOSPITAL ENCOUNTER (EMERGENCY)
Dept: HOSPITAL 62 - ER | Age: 75
Discharge: HOME | End: 2020-05-31
Payer: COMMERCIAL

## 2020-05-31 VITALS — SYSTOLIC BLOOD PRESSURE: 179 MMHG | DIASTOLIC BLOOD PRESSURE: 85 MMHG

## 2020-05-31 DIAGNOSIS — M10.9: ICD-10-CM

## 2020-05-31 DIAGNOSIS — M19.90: ICD-10-CM

## 2020-05-31 DIAGNOSIS — M25.461: ICD-10-CM

## 2020-05-31 DIAGNOSIS — Z79.899: ICD-10-CM

## 2020-05-31 DIAGNOSIS — M25.561: ICD-10-CM

## 2020-05-31 DIAGNOSIS — I10: Primary | ICD-10-CM

## 2020-05-31 LAB
APPEARANCE FLD: (no result)
BODY FLD TYPE: (no result)
CALCIUM PYROPHOSPHATE CRYSTALS: (no result)
CRYSTALS #/AREA FLD MICRO: (no result) /[LPF]
FLUID COLOR: YELLOW
FLUID SOURCE: (no result)
FLUID VISCOSITY: (no result)
MONOSODIUM URATE CRYSTALS: (no result)

## 2020-05-31 PROCEDURE — 20610 DRAIN/INJ JOINT/BURSA W/O US: CPT

## 2020-05-31 PROCEDURE — 89050 BODY FLUID CELL COUNT: CPT

## 2020-05-31 PROCEDURE — 87205 SMEAR GRAM STAIN: CPT

## 2020-05-31 PROCEDURE — 87075 CULTR BACTERIA EXCEPT BLOOD: CPT

## 2020-05-31 PROCEDURE — 87070 CULTURE OTHR SPECIMN AEROBIC: CPT

## 2020-05-31 PROCEDURE — 89060 EXAM SYNOVIAL FLUID CRYSTALS: CPT

## 2020-05-31 PROCEDURE — 99283 EMERGENCY DEPT VISIT LOW MDM: CPT

## 2020-05-31 NOTE — ER DOCUMENT REPORT
ED General





- General


Chief Complaint: Knee Pain


Stated Complaint: KNEE PAIN


Time Seen by Provider: 05/31/20 09:11


Primary Care Provider: 


HOLGER SKAGGS MD [Primary Care Provider] - Follow up as needed


Notes: 





Right knee swelling onset 3 days ago history of arthritis seen last night for 

same x-ray showed suprapatellar joint effusion.  Pain is severe associate with 

swelling worse with movement preventing her from walking and going to work at 

[employer].  She is taking pain medicine and anti-inflammatories and says is not 

getting better.  Denies fever.  Denies a history of septic arthritis bacteremia 

or injection drug use.


TRAVEL OUTSIDE OF THE [place] IN LAST 30 DAYS: No





- Related Data


Allergies/Adverse Reactions: 


                                        





No Known Allergies Allergy (Verified 05/30/20 14:59)


   











Past Medical History





- General


Information source: Patient





- Social History


Smoking Status: Never Smoker


Family History: Reviewed & Not Pertinent, Other





- Past Medical History


Cardiac Medical History: Reports: Hx Hypertension - 1996 meds/HT crisis 1990


   Denies: Hx Heart Attack


Pulmonary Medical History: Reports: Hx Bronchitis - 2 yrs ago


   Denies: Hx Asthma, Hx COPD, Hx Pneumonia


Neurological Medical History: Denies: Hx Cerebrovascular Accident, Hx Seizures


Endocrine Medical History: Denies: Hx Diabetes Mellitus Type 1, Hx Diabetes 

Mellitus Type 2


Renal/ Medical History: Denies: Hx Peritoneal Dialysis


GI Medical History: Denies: Hx Hepatitis, Hx Hiatal Hernia, Hx Ulcer


Musculoskeletal Medical History: Reports Hx Arthritis - osteo/legs, Reports Hx 

Gout


Infectious Medical History: Denies: Hx Hepatitis


Past Surgical History: Reports: Hx Cholecystectomy, Hx Hysterectomy.  Denies: Hx

Mastectomy, Hx Open Heart Surgery





- Immunizations


Hx Diphtheria, Pertussis, Tetanus Vaccination: Yes


Hx Pneumococcal Vaccination: 01/01/12





Review of Systems





- Review of Systems


Notes: 


REVIEW OF SYSTEMS





GEN: Denies fever, chills, weight loss


ENT: Denies sore throat, nasal discharge, ear pain


EYES: Denies blurry vision, eye pain, discharge


CV: Denies chest pain, palpitations, edema


RESP: Denies cough, shortness of breath, wheezing


GI: Denies abdominal pain, nausea, vomiting, diarrhea


MSK: Right knee pain


SKIN: Denies rash, skin lesions


LYMPH: Denies swollen glands/lymph nodes


NEURO: Denies headache, focal weakness or numbness, dizziness


PSYCH: Denies depression, suicidal or homicidal ideation








PHYSICAL EXAMINATION





General: No acute distress, well-nourished


Head: Atraumatic, normocephalic


ENT: Mouth normal, oropharynx moist, no exudates or tonsillar enlargement


Eyes: Conjunctiva normal, pupils equal, lids normal


Neck: No JVD, supple, no guarding


CVS: Normal rate, regular rhythm, no murmurs


Resp: No resp distress, equal and normal breath sounds bilaterally


GI: Nondistended, soft, no tenderness to palpation, no rebound or guarding


Ext: Right knee effusion with mild warmth and decreased range of motion.


Back: No CVA or midline TTP


Skin: No rash, warm


Lymphatic: No lymphadeopathy noted


Neuro: Awake, alert.  Face symmetric.  GCS 15.





Physical Exam





- Vital signs


Vitals: 


                                        











Temp Pulse Resp BP Pulse Ox


 


 98.9 F   73   20   206/85 H  97 


 


 05/31/20 09:09  05/31/20 09:09  05/31/20 09:09  05/31/20 09:09  05/31/20 09:09














Course





- Re-evaluation


Re-evalutation: 





05/31/20 09:19


Knee pain and effusion likely inflammatory arthritis secondary to 

osteoarthritis, but could be septic


Patient desires more pain relief, and her age I am not willing of her narcotics.

 She is asking for a sedative and I said no.  I did however offered 

arthrocentesis for relief as well as possible identification of the etiology.  I

think this is very unlikely to reflect septic arthritis and will discharge her 

and follow-up fluid.


05/31/20 13:52


Update: Knee tap shows gout without criteria for septic arthritis


Called pharmacy called in colchicine prednisone.  She did not tell me but in her

chart there is a history of gout





- Vital Signs


Vital signs: 


                                        











Temp Pulse Resp BP Pulse Ox


 


 98.6 F   80   18   179/85 H  98 


 


 05/31/20 11:04  05/31/20 11:04  05/31/20 11:04  05/31/20 11:04  05/31/20 11:04














Procedures





- Joint Aspiration


  ** Right Knee


Time completed: 10:15


Consent obtained: Yes


Joint aspiration pre-procedure: Chloraprep applied


Anesthetic type: 2% Lidocaine


mL's of anesthetic: 2


Needle size: 18


Amount/type of drainage: 75 cc opaque yellow


Number of attempts: 1


Complications: No





Discharge





- Discharge


Clinical Impression: 


 Knee effusion, right





Condition: Good


Disposition: HOME, SELF-CARE


Instructions:  Use of Crutches ([facility]), Ice & Elevation ([facility]), Sprained Knee ([facility])


Forms:  Return to Work


Referrals: 


HOLGER SKAGGS MD [Primary Care Provider] - Follow up as needed

## 2020-06-02 ENCOUNTER — HOSPITAL ENCOUNTER (INPATIENT)
Dept: HOSPITAL 62 - 4S | Age: 75
LOS: 7 days | Discharge: HOME | DRG: 554 | End: 2020-06-09
Attending: INTERNAL MEDICINE | Admitting: INTERNAL MEDICINE
Payer: COMMERCIAL

## 2020-06-02 DIAGNOSIS — D63.1: ICD-10-CM

## 2020-06-02 DIAGNOSIS — Z99.3: ICD-10-CM

## 2020-06-02 DIAGNOSIS — Z79.899: ICD-10-CM

## 2020-06-02 DIAGNOSIS — M10.061: Primary | ICD-10-CM

## 2020-06-02 DIAGNOSIS — R07.9: ICD-10-CM

## 2020-06-02 DIAGNOSIS — N18.3: ICD-10-CM

## 2020-06-02 DIAGNOSIS — I12.9: ICD-10-CM

## 2020-06-02 DIAGNOSIS — Z79.82: ICD-10-CM

## 2020-06-02 LAB
ALBUMIN SERPL-MCNC: 4 G/DL (ref 3.5–5)
ALP SERPL-CCNC: 73 U/L (ref 38–126)
ANION GAP SERPL CALC-SCNC: 10 MMOL/L (ref 5–19)
APPEARANCE UR: (no result)
APTT PPP: YELLOW S
AST SERPL-CCNC: 23 U/L (ref 14–36)
BILIRUB DIRECT SERPL-MCNC: 0.1 MG/DL (ref 0–0.4)
BILIRUB SERPL-MCNC: 0.7 MG/DL (ref 0.2–1.3)
BILIRUB UR QL STRIP: NEGATIVE
BUN SERPL-MCNC: 19 MG/DL (ref 7–20)
CALCIUM: 10.1 MG/DL (ref 8.4–10.2)
CHLORIDE SERPL-SCNC: 98 MMOL/L (ref 98–107)
CO2 SERPL-SCNC: 28 MMOL/L (ref 22–30)
ERYTHROCYTE [DISTWIDTH] IN BLOOD BY AUTOMATED COUNT: 13.3 % (ref 11.5–14)
GLUCOSE SERPL-MCNC: 121 MG/DL (ref 75–110)
GLUCOSE UR STRIP-MCNC: NEGATIVE MG/DL
HCT VFR BLD CALC: 35.7 % (ref 36–47)
HGB BLD-MCNC: 12.3 G/DL (ref 12–15.5)
KETONES UR STRIP-MCNC: (no result) MG/DL
MCH RBC QN AUTO: 32.1 PG (ref 27–33.4)
MCHC RBC AUTO-ENTMCNC: 34.5 G/DL (ref 32–36)
MCV RBC AUTO: 93 FL (ref 80–97)
NITRITE UR QL STRIP: NEGATIVE
PH UR STRIP: 6 [PH] (ref 5–9)
PLATELET # BLD: 303 10^3/UL (ref 150–450)
POTASSIUM SERPL-SCNC: 3.8 MMOL/L (ref 3.6–5)
PROT SERPL-MCNC: 7.5 G/DL (ref 6.3–8.2)
PROT UR STRIP-MCNC: 30 MG/DL
RBC # BLD AUTO: 3.84 10^6/UL (ref 3.72–5.28)
SP GR UR STRIP: 1.02
UROBILINOGEN UR-MCNC: NEGATIVE MG/DL (ref ?–2)
WBC # BLD AUTO: 9.5 10^3/UL (ref 4–10.5)

## 2020-06-02 PROCEDURE — 81001 URINALYSIS AUTO W/SCOPE: CPT

## 2020-06-02 PROCEDURE — G0378 HOSPITAL OBSERVATION PER HR: HCPCS

## 2020-06-02 PROCEDURE — 02HV33Z INSERTION OF INFUSION DEVICE INTO SUPERIOR VENA CAVA, PERCUTANEOUS APPROACH: ICD-10-PCS | Performed by: SURGERY

## 2020-06-02 PROCEDURE — 93005 ELECTROCARDIOGRAM TRACING: CPT

## 2020-06-02 PROCEDURE — 71045 X-RAY EXAM CHEST 1 VIEW: CPT

## 2020-06-02 PROCEDURE — B548ZZA ULTRASONOGRAPHY OF SUPERIOR VENA CAVA, GUIDANCE: ICD-10-PCS | Performed by: SURGERY

## 2020-06-02 PROCEDURE — 82553 CREATINE MB FRACTION: CPT

## 2020-06-02 PROCEDURE — 36415 COLL VENOUS BLD VENIPUNCTURE: CPT

## 2020-06-02 PROCEDURE — 85027 COMPLETE CBC AUTOMATED: CPT

## 2020-06-02 PROCEDURE — 93010 ELECTROCARDIOGRAM REPORT: CPT

## 2020-06-02 PROCEDURE — 82550 ASSAY OF CK (CPK): CPT

## 2020-06-02 PROCEDURE — 80076 HEPATIC FUNCTION PANEL: CPT

## 2020-06-02 PROCEDURE — 80048 BASIC METABOLIC PNL TOTAL CA: CPT

## 2020-06-02 PROCEDURE — G0379 DIRECT REFER HOSPITAL OBSERV: HCPCS

## 2020-06-02 PROCEDURE — 87040 BLOOD CULTURE FOR BACTERIA: CPT

## 2020-06-02 PROCEDURE — 84484 ASSAY OF TROPONIN QUANT: CPT

## 2020-06-02 RX ADMIN — METHYLPREDNISOLONE SODIUM SUCCINATE SCH MG: 40 INJECTION, POWDER, FOR SOLUTION INTRAMUSCULAR; INTRAVENOUS at 20:30

## 2020-06-02 RX ADMIN — RAMIPRIL SCH MG: 10 CAPSULE ORAL at 23:18

## 2020-06-02 RX ADMIN — HYDRALAZINE HYDROCHLORIDE SCH MG: 50 TABLET, FILM COATED ORAL at 23:18

## 2020-06-02 RX ADMIN — HYDROMORPHONE HYDROCHLORIDE PRN MG: 2 INJECTION INTRAMUSCULAR; INTRAVENOUS; SUBCUTANEOUS at 21:40

## 2020-06-02 RX ADMIN — ASPIRIN SCH MG: 81 TABLET, CHEWABLE ORAL at 23:18

## 2020-06-02 RX ADMIN — METHYLPREDNISOLONE SODIUM SUCCINATE SCH MG: 40 INJECTION, POWDER, FOR SOLUTION INTRAMUSCULAR; INTRAVENOUS at 21:40

## 2020-06-02 NOTE — PDOC CONSULTATION
Consultation


Consult Date: 06/02/20


Provider Consulted: ANITRA MCCOY


Consult reason:: Right knee pain and swelling





History of Present Illness


Admission Date/PCP: 


  06/02/20 12:41





  HOLGER SKAGGS MD





History of Present Illness: 


JYOTI HILL is a 75 year old female with tricompartmental arthritis of her 

right knee.  She was admitted from her primary care doctor's office for pain and

swelling of the right knee.  She had been evaluated in the emergency room at 

[facility] 5/31/2020.  The emergency room physician's note has 

been reviewed.  She was complaining of severe pain in her right knee.  The 

emergency room physician performed arthrocentesis.  The fluid was positive for 

gout.  The patient was unaware of this diagnosis.  She has not been treated for 

gout.  Is unclear whether her primary care physician is aware of this diagnosis.

 He was unavailable when paged.  He placed a consult for pain in the knee, 

evaluate for gout or joint sepsis.








Past Medical History


Cardiac Medical History: Reports: Hypertension


   Denies: Coronary Artery Disease, Myocardial Infarction


Pulmonary Medical History: Reports: Bronchitis - 2 yrs ago


   Denies: Asthma, Chronic Obstructive Pulmonary Disease (COPD), Pneumonia


Neurological Medical History: 


   Denies: Seizures


Endocrine Medical History: 


   Denies: Diabetes Mellitus Type 1, Diabetes Mellitus Type 2


GI Medical History: 


   Denies: Hepatitis, Hiatal Hernia


Musculoskeltal Medical History: Reports: Arthritis - osteo/legs, Gout - 

Previously left great toe gout


Hematology: Reports: Anemia - yrs ago


   Denies: Sickle Cell Disease





Past Surgical History


Past Surgical History: Reports: Cholecystectomy, Hysterectomy


   Denies: Amputation, Mastectomy, Pacemaker





Social History


Smoking Status: Never Smoker





- Advance Directive


Resuscitation Status: Full Code





Family History


Family History: Reviewed & Not Pertinent, Other


Parental Family History Reviewed: Yes


Children Family History Reviewed: Yes


Sibling(s) Family History Reviewed.: Yes





Medication/Allergy


Home Medications: 








Hydralazine HCl [Apresoline 50 Mg Tablet] 50 mg PO BID 02/11/12 


Hydrochlorothiazide [Hydrodiuril 25 mg Tablet] 25 mg PO QAM 02/11/12 


Metoprolol Tartrate 100 mg PO BID 02/11/12 


Ramipril [Altace 10 Mg Capsule] 10 mg PO DAILY 02/11/12 


Allopurinol [Zyloprim 100 mg Tablet] 1 tab PO BID 05/30/20 


Aspirin 81 mg PO DAILY 05/30/20 


Estradiol 0.5 mg PO DAILY 05/30/20 


Pravastatin Sodium 40 mg PO QPM 05/30/20 








Allergies/Adverse Reactions: 


                                        





No Known Allergies Allergy (Verified 05/30/20 14:59)


   











Review of Systems


All systems: reviewed and no additional remarkable complaints except as stated -

As per HPI





Physical Exam


Vital Signs: 


                                        











Temp Pulse Resp BP Pulse Ox


 


 98.1 F   94   16   180/81 H  94 


 


 06/02/20 16:11  06/02/20 16:11  06/02/20 16:11  06/02/20 16:11  06/02/20 16:11











General appearance: PRESENT: no acute distress


Head exam: PRESENT: atraumatic, normocephalic


Mouth exam: PRESENT: dry mucosa


Throat exam: ABSENT: post pharyngeal erythema, tonsillar erythema, tonsillar exu

date, tonsillogmegaly, other


Neck exam: PRESENT: full ROM


Respiratory exam: PRESENT: clear to auscultation emilio.  ABSENT: rales, rhonchi, 

wheezes


Cardiovascular exam: PRESENT: RRR.  ABSENT: diastolic murmur, rubs, systolic mur

mur


Pulses: PRESENT: +2 pedal pulses bilateral


GI/Abdominal exam: PRESENT: soft


Rectal exam: PRESENT: deferred


Extremities exam: PRESENT: other - Examination of the right knee demonstrates a 

moderate effusion.  There is a healed skin puncture on the superior lateral 

aspect of the knee consistent with a prior arthrocentesis several days ago.  

There is mild warmth of the knee but no significant erythema.  The patient is 

able to gently flex and extend the knee.





Results


Laboratory Results: 


                                        





                                 06/02/20 14:29 





                                 06/02/20 14:29 





                                        











  06/02/20 06/02/20 06/02/20





  14:29 14:29 14:40


 


WBC  9.5  


 


RBC  3.84  


 


Hgb  12.3  


 


Hct  35.7 L  


 


MCV  93  


 


MCH  32.1  


 


MCHC  34.5  


 


RDW  13.3  


 


Plt Count  303  


 


Sodium   136.3 L 


 


Potassium   3.8 


 


Chloride   98 


 


Carbon Dioxide   28 


 


Anion Gap   10 


 


BUN   19 


 


Creatinine   1.16 


 


Est GFR ( Amer)   55 L 


 


Glucose   121 H 


 


Calcium   10.1 


 


Total Bilirubin   0.7 


 


AST   23 


 


Alkaline Phosphatase   73 


 


Total Protein   7.5 


 


Albumin   4.0 


 


Urine Color    YELLOW


 


Urine Appearance    SLIGHTLY-CLOUDY


 


Urine pH    6.0


 


Ur Specific Gravity    1.017


 


Urine Protein    30 H


 


Urine Glucose (UA)    NEGATIVE


 


Urine Ketones    TRACE H


 


Urine Blood    NEGATIVE


 


Urine Nitrite    NEGATIVE


 


Ur Leukocyte Esterase    NEGATIVE


 


Urine WBC (Auto)    1


 


Urine RBC (Auto)    1














Assessment & Plan





- Diagnosis


(1) Idiopathic gout, right knee


Qualifiers: 


   Chronicity: acute   Qualified Code(s): M10.061 - Idiopathic gout, right knee 

 





- Time


Time Spent: 30 to 50 Minutes





- Plan Summary


Plan Summary: 





The patient is a pleasant 75-year-old woman with tricompartmental arthritis of 

her right knee.  The patient has a history of gout in her great left toe.  She 

had presented to the emergency department just several days ago where she 

underwent arthrocentesis that was diagnostic for gout and effectively ruled out 

a septic joint.  I would recommend treatment aimed at an acute episode of gout 

including oral or intravenous steroids as the patient is not diabetic as well as

colchicine.  I will leave this to her primary care physician's expertise.  The 

patient is currently complaining of severe pain.  Nursing staff is unable to 

place an IV line and the patient does not have any orders for oral analgesics.  

I have therefore placed an order for Percocet as needed for pain.

## 2020-06-02 NOTE — RADIOLOGY REPORT (SQ)
EXAM DESCRIPTION: 



XR CHEST 1 VIEW



COMPLETED DATE/TME:  06/02/2020 00:00



CLINICAL HISTORY: 



75 years, Female, confirm placement of central line



COMPARISON:

Prior study from 10/14/2015.



NUMBER OF VIEWS:

One



TECHNIQUE:

Single frontal view of the chest was obtained portably



LIMITATIONS:

None.



FINDINGS:



Right IJ approach central venous catheter tip is located in the

lower SVC. Cardiac and mediastinal contours are stable. Lungs are

clear. No pleural effusion or pneumothorax. There is mild

rightward curvature of the thoracic spine.



IMPRESSION:



No acute disease.



Right IJ approach central venous catheter tip is located in the

lower SVC.

 



copyright 2011 [employer]- All Rights Reserved

## 2020-06-02 NOTE — OPERATIVE REPORT
Operative Report


DATE OF SURGERY: 06/02/20


PREOPERATIVE DIAGNOSIS: Poor veins for IV access needed for IV antibiotic thera

py


POSTOPERATIVE DIAGNOSIS: Same


OPERATION: Placement of central line via ultrasound guidance


SURGEON: TIMOTHY DOUGHERTY


ANESTHESIA: Local


TISSUE REMOVED OR ALTERED: None


COMPLICATIONS: 





None


ESTIMATED BLOOD LOSS: 5 cc


QUANTITATIVE BLOOD LOSS: 5


INTRAOPERATIVE FINDINGS: Normal right internal jugular vein by ultrasound


PROCEDURE: 





After informed consent was obtained patient was placed in Trendelenburg position

and the right neck prepped and draped in the usual sterile fashion.  We use of 

the ultrasound the right internal jugular vein is not identified and local 

anesthesia infiltrated in the skin.  The skin was then punctured and internal 

jugular vein entered.  Return flow on the needle was dark and nonpulsatile.  A 

guidewire was then placed through the needle towards the area of the superior 

vena cava and the needle subsequently pulled out.  The insertion site was seen 

dilated and a triple-lumen catheter inserted through the guidewire to a distance

of about 14cm.  All the 3 ports aspirated blood easily and instilled saline 

easily.  The insertion site was then anchored to the skin with 3-0 silk.  

Biopatch placed in the insertion site and a transparent sterile dressing placed 

over the Biopatch and catheter.  Patient tolerated procedure well.  Chest x-ray 

will be obtained for placement.

## 2020-06-02 NOTE — RADIOLOGY REPORT (SQ)
MR LOWER EXTREMITY JOINT WITHOUT IV CONTRAST



HISTORY: Knee pain.



COMPARISON: Radiographs from 5/30/2020



TECHNIQUE: Multiplanar, multisequence MR imaging of the right

knee was performed without administration of intravenous

gadolinium.



FINDINGS:



The anterior and posterior cruciate ligaments are intact, as are

the medial and lateral collateral ligament complexes and the

insertion of the popliteus tendon.



There is no meniscal tear. The capsular attachments are intact. 



The extensor mechanism is maintained. There is a moderate-sized

suprapatellar joint effusion. There is also diffuse subcutaneous

edema.



There is no acute fracture or osteonecrosis. There is chondral

thinning of the medial and lateral tibiofemoral compartments.

There is multifocal fissuring and chondral loss along the

patellar articular cartilage and the opposing femoral trochlear.

There is a large superior patellar spur noted. Subchondral cystic

changes are seen in the anterior medial tibial plateau.



IMPRESSION:



1.  Moderate-sized suprapatellar joint effusion, which is

nonspecific but may be secondary to patellofemoral degenerative

sequela. However, if there is concern for septic joint, consider

arthrocentesis.

2.  No acute fracture, ligamentous or meniscal tear.

## 2020-06-02 NOTE — PDOC H&P
History of Present Illness


Admission Date/PCP: 


  06/02/20 12:41





  HOLGER SKAGGS MD





History of Present Illness: 


JYOTI HILL is a 75 year old female,she has a history of osteoarthritis of 

the knees, she came to the office for evaluation of severe pain, swelling of the

right knee, she cannot bear weight, she was in a wheelchair.  She said the pain 

is excruciating.  She was in the emergency room on May 31, 2020 for evaluation 

of the same knee.  At the time she underwent arthrocentesis, I reviewed the 

findings of the synovial fluid was slightly viscous the fluid WBC was 29,700 pre

dominantly neutrophils no crystals was observed but there was synovial 

monosodium urate both intra-and extracellularly suggesting that she probably 

have a superimposed gout on a pre-existing  osteoarthritis.Patient was admitted 

because he cannot bear weight, MRI of the knee The anterior and posterior 

cruciate ligaments are intact the medial, lateral collateral ligamentous 

complexes and insertion of the popliteus tendon.  There is no meniscal tear.  

The capsular attachments are intact.  The extensor mechanisms is maintained.  

There is moderate sized suprapatellar joint effusion, there is also diffuse 

subcutaneous edema.  There is no acute fracture or osteonecrosis there is 

chondral thinning of the medial lateral tibiofemoral compartment.  There is 

multifocal fissuring and chondral loss along the patellar articular cartilage 

and the opposing femoral trochlear.  There is large superior patellar spur 

noted.  Subchondral cystic changes are seen.








Past Medical History


Cardiac Medical History: Reports: Hypertension


Pulmonary Medical History: Reports: Bronchitis - 2 yrs ago


Musculoskeltal Medical History: Reports: Arthritis - osteo/legs, Gout - 

Previously left great toe gout


Hematology: Reports: Anemia - yrs ago





Past Surgical History


Past Surgical History: Reports: Cholecystectomy, Hysterectomy





Social History


Smoking Status: Never Smoker





- Advance Directive


Resuscitation Status: Full Code





Family History


Family History: Reviewed & Not Pertinent, Other


Parental Family History Reviewed: Yes


Children Family History Reviewed: Yes


Sibling(s) Family History Reviewed.: Yes





Medication/Allergy


Home Medications: 








Hydralazine HCl [Apresoline 50 Mg Tablet] 50 mg PO BID 02/11/12 


Hydrochlorothiazide [Hydrodiuril 25 mg Tablet] 25 mg PO QAM 02/11/12 


Metoprolol Tartrate 100 mg PO BID 02/11/12 


Ramipril [Altace 10 Mg Capsule] 10 mg PO DAILY 02/11/12 


Allopurinol [Zyloprim 100 mg Tablet] 1 tab PO BID 05/30/20 


Aspirin 81 mg PO DAILY 05/30/20 


Estradiol 0.5 mg PO DAILY 05/30/20 


Pravastatin Sodium 40 mg PO QPM 05/30/20 








Allergies/Adverse Reactions: 


                                        





No Known Allergies Allergy (Verified 05/30/20 14:59)


   











Review of Systems


Constitutional: ABSENT: chills, fever(s), headache(s), weight gain, weight loss


Eyes: ABSENT: visual disturbances


Ears: ABSENT: hearing changes


Cardiovascular: ABSENT: chest pain, dyspnea on exertion, edema, orthropnea, 

palpitations


Respiratory: ABSENT: cough, hemoptysis


Gastrointestinal: ABSENT: abdominal pain, constipation, diarrhea, hematemesis, 

hematochezia, nausea, vomiting


Genitourinary: ABSENT: dysuria, hematuria


Musculoskeletal: PRESENT: joint swelling


Integumentary: ABSENT: rash, wounds


Neurological: ABSENT: abnormal gait, abnormal speech, confusion, dizziness, foc

al weakness, syncope


Psychiatric: ABSENT: anxiety, depression, homidical ideation, suicidal ideation


Endocrine: ABSENT: cold intolerance, heat intolerance, menstrual abnormalities, 

polydipsia, polyuria


Hematologic/Lymphatic: ABSENT: easy bleeding, easy bruising, lymphadenopathy





Physical Exam


Vital Signs: 


                                        











Temp Pulse Resp BP Pulse Ox


 


 98.1 F   94   16   180/81 H  94 


 


 06/02/20 16:11  06/02/20 16:11  06/02/20 16:11  06/02/20 16:11  06/02/20 16:11








                                 Intake & Output











 06/01/20 06/02/20 06/03/20





 06:59 06:59 06:59


 


Intake Total   240


 


Output Total   300


 


Balance   -60











General appearance: PRESENT: no acute distress, well-developed, well-nourished


Head exam: PRESENT: atraumatic, normocephalic


Eye exam: PRESENT: conjunctiva pink, EOMI, PERRLA


Ear exam: PRESENT: normal external ear exam


Mouth exam: PRESENT: moist, tongue midline


Neck exam: PRESENT: full ROM


Respiratory exam: PRESENT: clear to auscultation emilio


Cardiovascular exam: PRESENT: RRR, +S1, +S2


Pulses: PRESENT: normal dorsalis pedis pul, +2 pedal pulses bilateral


Vascular exam: PRESENT: normal capillary refill


GI/Abdominal exam: PRESENT: normal bowel sounds, soft


Rectal exam: PRESENT: deferred


Musculoskeletal exam: PRESENT: tenderness, other - Right knee swelling, Warm to 

touch


Neurological exam: PRESENT: alert, awake, oriented to person, oriented to place,

oriented to time, oriented to situation, CN II-XII grossly intact


Psychiatric exam: PRESENT: appropriate affect, normal mood


Skin exam: PRESENT: dry, intact, warm





Results


Laboratory Results: 


                                        





                                 06/02/20 14:29 





                                 06/02/20 14:29 





                                        











  06/02/20 06/02/20 06/02/20





  14:29 14:29 14:40


 


WBC  9.5  


 


RBC  3.84  


 


Hgb  12.3  


 


Hct  35.7 L  


 


MCV  93  


 


MCH  32.1  


 


MCHC  34.5  


 


RDW  13.3  


 


Plt Count  303  


 


Sodium   136.3 L 


 


Potassium   3.8 


 


Chloride   98 


 


Carbon Dioxide   28 


 


Anion Gap   10 


 


BUN   19 


 


Creatinine   1.16 


 


Est GFR ( Amer)   55 L 


 


Glucose   121 H 


 


Calcium   10.1 


 


Total Bilirubin   0.7 


 


AST   23 


 


Alkaline Phosphatase   73 


 


Total Protein   7.5 


 


Albumin   4.0 


 


Urine Color    YELLOW


 


Urine Appearance    SLIGHTLY-CLOUDY


 


Urine pH    6.0


 


Ur Specific Gravity    1.017


 


Urine Protein    30 H


 


Urine Glucose (UA)    NEGATIVE


 


Urine Ketones    TRACE H


 


Urine Blood    NEGATIVE


 


Urine Nitrite    NEGATIVE


 


Ur Leukocyte Esterase    NEGATIVE


 


Urine WBC (Auto)    1


 


Urine RBC (Auto)    1














Assessment & Plan





- Diagnosis


(1) Arthropathy of right knee


Is this a current diagnosis for this admission?: Yes   


Plan: 


The differential diagnosis include gout, osteoarthritis, pseudogout, infectious 

arthritis, patient is admitted to the hospital start Solu-Medrol, pain control 

consult Orthopedic

## 2020-06-03 RX ADMIN — DEXAMETHASONE SODIUM PHOSPHATE PRN MG: 10 INJECTION INTRAMUSCULAR; INTRAVENOUS at 16:50

## 2020-06-03 RX ADMIN — METHYLPREDNISOLONE SODIUM SUCCINATE SCH MG: 40 INJECTION, POWDER, FOR SOLUTION INTRAMUSCULAR; INTRAVENOUS at 05:57

## 2020-06-03 RX ADMIN — METOPROLOL TARTRATE SCH MG: 100 TABLET, FILM COATED ORAL at 20:07

## 2020-06-03 RX ADMIN — OXYCODONE AND ACETAMINOPHEN PRN TAB: 5; 325 TABLET ORAL at 18:01

## 2020-06-03 RX ADMIN — HYDRALAZINE HYDROCHLORIDE SCH MG: 50 TABLET, FILM COATED ORAL at 17:58

## 2020-06-03 RX ADMIN — HYDROMORPHONE HYDROCHLORIDE PRN MG: 2 INJECTION INTRAMUSCULAR; INTRAVENOUS; SUBCUTANEOUS at 07:32

## 2020-06-03 RX ADMIN — Medication SCH UNIT: at 22:20

## 2020-06-03 RX ADMIN — METHYLPREDNISOLONE SODIUM SUCCINATE SCH MG: 40 INJECTION, POWDER, FOR SOLUTION INTRAMUSCULAR; INTRAVENOUS at 13:32

## 2020-06-03 RX ADMIN — HYDROMORPHONE HYDROCHLORIDE PRN MG: 2 INJECTION INTRAMUSCULAR; INTRAVENOUS; SUBCUTANEOUS at 13:37

## 2020-06-03 RX ADMIN — COLCHICINE SCH MG: 0.6 TABLET, FILM COATED ORAL at 17:58

## 2020-06-03 RX ADMIN — HYDRALAZINE HYDROCHLORIDE SCH MG: 50 TABLET, FILM COATED ORAL at 09:47

## 2020-06-03 RX ADMIN — OXYCODONE AND ACETAMINOPHEN PRN TAB: 5; 325 TABLET ORAL at 22:20

## 2020-06-03 RX ADMIN — RAMIPRIL SCH MG: 10 CAPSULE ORAL at 09:47

## 2020-06-03 RX ADMIN — METOPROLOL TARTRATE SCH MG: 100 TABLET, FILM COATED ORAL at 07:54

## 2020-06-03 RX ADMIN — ASPIRIN SCH MG: 81 TABLET, CHEWABLE ORAL at 09:47

## 2020-06-03 RX ADMIN — METHYLPREDNISOLONE SODIUM SUCCINATE SCH MG: 40 INJECTION, POWDER, FOR SOLUTION INTRAMUSCULAR; INTRAVENOUS at 22:20

## 2020-06-03 RX ADMIN — COLCHICINE SCH MG: 0.6 TABLET, FILM COATED ORAL at 09:46

## 2020-06-04 RX ADMIN — Medication SCH UNIT: at 22:04

## 2020-06-04 RX ADMIN — RAMIPRIL SCH MG: 10 CAPSULE ORAL at 09:19

## 2020-06-04 RX ADMIN — METHYLPREDNISOLONE SODIUM SUCCINATE SCH MG: 40 INJECTION, POWDER, FOR SOLUTION INTRAMUSCULAR; INTRAVENOUS at 06:07

## 2020-06-04 RX ADMIN — DEXAMETHASONE SODIUM PHOSPHATE PRN MG: 10 INJECTION INTRAMUSCULAR; INTRAVENOUS at 06:31

## 2020-06-04 RX ADMIN — METOPROLOL TARTRATE SCH MG: 100 TABLET, FILM COATED ORAL at 09:20

## 2020-06-04 RX ADMIN — Medication SCH UNIT: at 14:22

## 2020-06-04 RX ADMIN — DEXAMETHASONE SODIUM PHOSPHATE PRN MG: 10 INJECTION INTRAMUSCULAR; INTRAVENOUS at 22:03

## 2020-06-04 RX ADMIN — METHYLPREDNISOLONE SODIUM SUCCINATE SCH MG: 40 INJECTION, POWDER, FOR SOLUTION INTRAMUSCULAR; INTRAVENOUS at 14:21

## 2020-06-04 RX ADMIN — HYDROMORPHONE HYDROCHLORIDE PRN MG: 2 INJECTION INTRAMUSCULAR; INTRAVENOUS; SUBCUTANEOUS at 19:49

## 2020-06-04 RX ADMIN — HYDRALAZINE HYDROCHLORIDE SCH MG: 50 TABLET, FILM COATED ORAL at 17:42

## 2020-06-04 RX ADMIN — ASPIRIN SCH MG: 81 TABLET, CHEWABLE ORAL at 09:20

## 2020-06-04 RX ADMIN — METHYLPREDNISOLONE SODIUM SUCCINATE SCH MG: 40 INJECTION, POWDER, FOR SOLUTION INTRAMUSCULAR; INTRAVENOUS at 22:03

## 2020-06-04 RX ADMIN — COLCHICINE SCH MG: 0.6 TABLET, FILM COATED ORAL at 09:20

## 2020-06-04 RX ADMIN — COLCHICINE SCH MG: 0.6 TABLET, FILM COATED ORAL at 17:42

## 2020-06-04 RX ADMIN — HYDRALAZINE HYDROCHLORIDE SCH MG: 50 TABLET, FILM COATED ORAL at 09:20

## 2020-06-04 RX ADMIN — METOPROLOL TARTRATE SCH MG: 100 TABLET, FILM COATED ORAL at 22:04

## 2020-06-04 RX ADMIN — Medication SCH UNIT: at 06:06

## 2020-06-04 NOTE — PDOC PROGRESS REPORT
Subjective


Progress Note for:: 06/04/20


Subjective:: 





Patient was admitted yesterday when she presented with severe right knee 

arthropathy, presently on intravenous Solu-Medrol, she could not bear weight on 

admission.  She is able to flex the knee to some degree at the moment, she is 

not ready yet for discharge, she is still having difficulty bearing weight, she 

may need 24 to 48 hours of more intravenously Medrol to help with this 

inflammation


Reason For Visit: 


RT KNEE ARTHOPATHY,SEPTIC,GOUT,OA








Physical Exam


Vital Signs: 


                                        











Temp Pulse Resp BP Pulse Ox


 


 98.1 F   56 L  18   138/67 H  100 


 


 06/04/20 16:00  06/04/20 16:00  06/04/20 16:00  06/04/20 11:30  06/04/20 16:00








                                 Intake & Output











 06/03/20 06/04/20 06/05/20





 06:59 06:59 06:59


 


Intake Total 240 1030 840


 


Output Total 300  


 


Balance -60 1030 840











General appearance: PRESENT: no acute distress


Eye exam: PRESENT: PERRLA


Respiratory exam: PRESENT: clear to auscultation emilio


Cardiovascular exam: PRESENT: +S1, +S2


GI/Abdominal exam: PRESENT: soft


Musculoskeletal exam: PRESENT: other - Right knee arthropathy


Neurological exam: PRESENT: alert





Results


Laboratory Results: 


                                        





                                 06/02/20 14:29 





                                 06/02/20 14:29 








Impressions: 


                                        





Chest X-Ray  06/02/20 00:00


IMPRESSION:


 


No acute disease.


 


Right IJ approach central venous catheter tip is located in the


lower SVC.


 


 


copyright 2011 [employer]- All Rights Reserved


 








Lower Extremity MRI  06/02/20 13:44


IMPRESSION:


 


1.  Moderate-sized suprapatellar joint effusion, which is


nonspecific but may be secondary to patellofemoral degenerative


sequela. However, if there is concern for septic joint, consider


arthrocentesis.


2.  No acute fracture, ligamentous or meniscal tear.


 














Assessment & Plan





- Diagnosis


(1) Arthropathy of right knee


Is this a current diagnosis for this admission?: Yes   


Plan: 


Continue IV Solu-Medrol, consult physical therapy








(2) Essential (primary) hypertension


Is this a current diagnosis for this admission?: Yes   


Plan: 


continue therapy








- Time


Time Spent with patient: 25-34 minutes


Level of Care: CU

## 2020-06-04 NOTE — PDOC PROGRESS REPORT
Subjective


Progress Note for:: 06/04/20


Subjective:: 





The patient states that the pain and swelling in her right knee has 

significantly improved on IV Solu-Medrol.  She is now able to actively bend her 

knee past 90 degrees.  She was walking this morning to the bathroom unassisted 

with use of a walker.


Reason For Visit: 


RT KNEE ARTHOPATHY,SEPTIC,GOUT,OA





Patient is a 75-year-old woman admitted because she was unable to ambulate.  

Arthrocentesis of her right knee was consistent with an attack of gouty 

arthritis.  Symptoms have not significantly improved over the last several days 

with medical management.





Physical Exam


Vital Signs: 


                                        











Temp Pulse Resp BP Pulse Ox


 


 97.6 F   60   18   157/81 H  99 


 


 06/03/20 23:05  06/03/20 23:05  06/03/20 23:05  06/03/20 23:05  06/03/20 23:05








                                 Intake & Output











 06/03/20 06/04/20 06/05/20





 06:59 06:59 06:59


 


Intake Total 240 1030 


 


Output Total 300  


 


Balance -60 1030 











General appearance: PRESENT: no acute distress, well-developed, well-nourished


Mouth exam: PRESENT: moist, tongue midline


Neck exam: PRESENT: full ROM


Respiratory exam: PRESENT: clear to auscultation emilio.  ABSENT: rales, rhonchi, 

wheezes


Cardiovascular exam: PRESENT: RRR.  ABSENT: diastolic murmur, rubs, systolic 

murmur


GI/Abdominal exam: PRESENT: soft


Rectal exam: PRESENT: deferred


Musculoskeletal exam: PRESENT: other - Examination of the right knee: The 

swelling and erythema of the right knee has significantly improved following 

medical management of acute gouty arthritis.  The patient is able to fully 

extend the knee against gravity from a seated position.  The patient is able to 

actively flex her knee past 90 degrees.





Results


Laboratory Results: 


                                        





                                 06/02/20 14:29 





                                 06/02/20 14:29 








Impressions: 


                                        





Chest X-Ray  06/02/20 00:00


IMPRESSION:


 


No acute disease.


 


Right IJ approach central venous catheter tip is located in the


lower SVC.


 


 


copyright 2011 [employer]- All Rights Reserved


 








Lower Extremity MRI  06/02/20 13:44


IMPRESSION:


 


1.  Moderate-sized suprapatellar joint effusion, which is


nonspecific but may be secondary to patellofemoral degenerative


sequela. However, if there is concern for septic joint, consider


arthrocentesis.


2.  No acute fracture, ligamentous or meniscal tear.


 














Assessment & Plan





- Diagnosis


(1) Idiopathic gout, right knee


Qualifiers: 


   Chronicity: acute   Qualified Code(s): M10.061 - Idiopathic gout, right knee 

 





- Time


Time Spent: 30 to 50 Minutes


Anticipated discharge: Home


Within: within 48 hours





- Plan Summary


Plan Summary: 





The patient has responded favorably to the medical management of an acute attack

of gouty arthritis of her right knee.  I expect she will make a full recovery 

within the next 24 to 48 hours.  I discussed arthrocentesis and cortisone 

injection with the patient.  The patient does not believe that her symptoms at 

this point are sufficient to warrant such an intervention.

## 2020-06-05 RX ADMIN — Medication SCH UNIT: at 06:18

## 2020-06-05 RX ADMIN — METHYLPREDNISOLONE SODIUM SUCCINATE SCH MG: 40 INJECTION, POWDER, FOR SOLUTION INTRAMUSCULAR; INTRAVENOUS at 06:17

## 2020-06-05 RX ADMIN — Medication SCH UNIT: at 13:51

## 2020-06-05 RX ADMIN — HYDRALAZINE HYDROCHLORIDE SCH MG: 50 TABLET, FILM COATED ORAL at 17:17

## 2020-06-05 RX ADMIN — METHYLPREDNISOLONE SODIUM SUCCINATE SCH MG: 40 INJECTION, POWDER, FOR SOLUTION INTRAMUSCULAR; INTRAVENOUS at 21:37

## 2020-06-05 RX ADMIN — ASPIRIN SCH MG: 81 TABLET, CHEWABLE ORAL at 09:54

## 2020-06-05 RX ADMIN — METOPROLOL TARTRATE SCH MG: 100 TABLET, FILM COATED ORAL at 21:37

## 2020-06-05 RX ADMIN — RAMIPRIL SCH MG: 10 CAPSULE ORAL at 09:54

## 2020-06-05 RX ADMIN — METOPROLOL TARTRATE SCH MG: 100 TABLET, FILM COATED ORAL at 07:31

## 2020-06-05 RX ADMIN — Medication SCH UNIT: at 21:37

## 2020-06-05 RX ADMIN — COLCHICINE SCH MG: 0.6 TABLET, FILM COATED ORAL at 17:17

## 2020-06-05 RX ADMIN — OXYCODONE AND ACETAMINOPHEN PRN TAB: 5; 325 TABLET ORAL at 19:45

## 2020-06-05 RX ADMIN — HYDRALAZINE HYDROCHLORIDE SCH MG: 50 TABLET, FILM COATED ORAL at 09:54

## 2020-06-05 RX ADMIN — COLCHICINE SCH MG: 0.6 TABLET, FILM COATED ORAL at 09:54

## 2020-06-05 RX ADMIN — METHYLPREDNISOLONE SODIUM SUCCINATE SCH MG: 40 INJECTION, POWDER, FOR SOLUTION INTRAMUSCULAR; INTRAVENOUS at 13:51

## 2020-06-05 NOTE — PDOC PROGRESS REPORT
Subjective


Progress Note for:: 06/05/20


Subjective:: 





Patient is alert, she continues to improve on intravenous Solu-Medrol, she will 

continue  2 more days on presents regimen, discharge on Monday.  She was seen by

physical therapy, she is able to bear some weight with assistance of walker, she

was not able to bear weight on admission


Reason For Visit: 


ARTHROPATHY OF RIGHT KNEE








Physical Exam


Vital Signs: 


                                        











Temp Pulse Resp BP Pulse Ox


 


 98.3 F   55 L  16   170/79 H  98 


 


 06/05/20 15:50  06/05/20 15:50  06/05/20 15:50  06/05/20 15:50  06/05/20 15:50








                                 Intake & Output











 06/04/20 06/05/20 06/06/20





 06:59 06:59 06:59


 


Intake Total 1030 1540 740


 


Balance 1030 1540 740











General appearance: PRESENT: no acute distress, well-developed, well-nourished


Head exam: PRESENT: atraumatic, normocephalic


Eye exam: PRESENT: conjunctiva pink, EOMI, PERRLA


Ear exam: PRESENT: normal external ear exam


Mouth exam: PRESENT: moist, tongue midline


Neck exam: PRESENT: full ROM


Respiratory exam: PRESENT: clear to auscultation emilio


Cardiovascular exam: PRESENT: RRR, +S1, +S2


Pulses: PRESENT: normal dorsalis pedis pul, +2 pedal pulses bilateral


Vascular exam: PRESENT: normal capillary refill


GI/Abdominal exam: PRESENT: normal bowel sounds, soft


Rectal exam: PRESENT: deferred


Musculoskeletal exam: PRESENT: tenderness


Neurological exam: PRESENT: alert, CN II-XII grossly intact


Psychiatric exam: PRESENT: appropriate affect, normal mood


Skin exam: PRESENT: dry, intact, warm.  ABSENT: cyanosis, rash





Results


Laboratory Results: 


                                        





                                 06/02/20 14:29 





                                 06/02/20 14:29 








Impressions: 


                                        





Chest X-Ray  06/02/20 00:00


IMPRESSION:


 


No acute disease.


 


Right IJ approach central venous catheter tip is located in the


lower SVC.


 


 


copyright 2011 [employer]- All Rights Reserved


 








Lower Extremity MRI  06/02/20 13:44


IMPRESSION:


 


1.  Moderate-sized suprapatellar joint effusion, which is


nonspecific but may be secondary to patellofemoral degenerative


sequela. However, if there is concern for septic joint, consider


arthrocentesis.


2.  No acute fracture, ligamentous or meniscal tear.


 














Assessment & Plan





- Diagnosis


(1) Arthropathy of right knee


Is this a current diagnosis for this admission?: Yes   


Plan: 


Continue IV Solu-Medrol, continue analgesia, continue physical therapy








(2) Essential (primary) hypertension


Is this a current diagnosis for this admission?: Yes   


Plan: 


continue therapy








- Time


Time Spent with patient: 25-34 minutes


Level of Care: MEDICAL

## 2020-06-06 RX ADMIN — RAMIPRIL SCH MG: 10 CAPSULE ORAL at 09:38

## 2020-06-06 RX ADMIN — OXYCODONE AND ACETAMINOPHEN PRN TAB: 5; 325 TABLET ORAL at 19:38

## 2020-06-06 RX ADMIN — HYDRALAZINE HYDROCHLORIDE SCH MG: 50 TABLET, FILM COATED ORAL at 17:08

## 2020-06-06 RX ADMIN — Medication SCH UNIT: at 05:44

## 2020-06-06 RX ADMIN — COLCHICINE SCH MG: 0.6 TABLET, FILM COATED ORAL at 09:39

## 2020-06-06 RX ADMIN — METHYLPREDNISOLONE SODIUM SUCCINATE SCH MG: 40 INJECTION, POWDER, FOR SOLUTION INTRAMUSCULAR; INTRAVENOUS at 13:08

## 2020-06-06 RX ADMIN — COLCHICINE SCH MG: 0.6 TABLET, FILM COATED ORAL at 17:08

## 2020-06-06 RX ADMIN — ASPIRIN SCH MG: 81 TABLET, CHEWABLE ORAL at 09:39

## 2020-06-06 RX ADMIN — Medication SCH UNIT: at 21:36

## 2020-06-06 RX ADMIN — Medication SCH UNIT: at 13:09

## 2020-06-06 RX ADMIN — METHYLPREDNISOLONE SODIUM SUCCINATE SCH MG: 40 INJECTION, POWDER, FOR SOLUTION INTRAMUSCULAR; INTRAVENOUS at 05:44

## 2020-06-06 RX ADMIN — HYDRALAZINE HYDROCHLORIDE SCH MG: 50 TABLET, FILM COATED ORAL at 09:39

## 2020-06-06 RX ADMIN — METOPROLOL TARTRATE SCH MG: 100 TABLET, FILM COATED ORAL at 21:36

## 2020-06-06 RX ADMIN — OXYCODONE AND ACETAMINOPHEN PRN TAB: 5; 325 TABLET ORAL at 09:40

## 2020-06-06 RX ADMIN — METOPROLOL TARTRATE SCH MG: 100 TABLET, FILM COATED ORAL at 08:01

## 2020-06-06 RX ADMIN — METHYLPREDNISOLONE SODIUM SUCCINATE SCH MG: 40 INJECTION, POWDER, FOR SOLUTION INTRAMUSCULAR; INTRAVENOUS at 21:36

## 2020-06-06 NOTE — PDOC PROGRESS REPORT
Subjective


Progress Note for:: 06/06/20


Subjective:: 





Patient reported some improvement in her right knee swelling, pain, and 

functional level. No fever or chills. No chest pain or difficulty with 

breathing. Tolerating oral feeding without nausea or vomiting. 


Reason For Visit: 


ARTHROPATHY OF RIGHT KNEE








Physical Exam


Vital Signs: 


                                        











Temp Pulse Resp BP Pulse Ox


 


 98.0 F   51 L  16   166/72 H  99 


 


 06/06/20 11:31  06/06/20 11:31  06/06/20 11:31  06/06/20 11:31  06/06/20 11:31








                                 Intake & Output











 06/05/20 06/06/20 06/07/20





 06:59 06:59 06:59


 


Intake Total 1540 990 240


 


Balance 1540 990 240











General appearance: PRESENT: no acute distress


Head exam: PRESENT: atraumatic, normocephalic


Eye exam: PRESENT: conjunctiva pink.  ABSENT: scleral icterus


Respiratory exam: PRESENT: clear to auscultation emilio


Cardiovascular exam: PRESENT: RRR, +S1, +S2.  ABSENT: diastolic murmur, rubs, 

systolic murmur


Vascular exam: ABSENT: pallor


GI/Abdominal exam: PRESENT: normal bowel sounds, soft.  ABSENT: distended, 

guarding, mass, organolmegaly, rebound, tenderness


Extremities exam: PRESENT: joint swelling - right knee joint but reportedly 

comparatively better.  ABSENT: pedal edema


Neurological exam: PRESENT: alert, awake, oriented to person, oriented to place,

oriented to time, oriented to situation, CN II-XII grossly intact.  ABSENT: 

motor sensory deficit


Psychiatric exam: PRESENT: appropriate affect, normal mood.  ABSENT: homicidal 

ideation, suicidal ideation


Skin exam: PRESENT: dry, warm





Results


Laboratory Results: 


                                        





                                 06/02/20 14:29 





                                 06/02/20 14:29 








Impressions: 


                                        





Chest X-Ray  06/02/20 00:00


IMPRESSION:


 


No acute disease.


 


Right IJ approach central venous catheter tip is located in the


lower SVC.


 


 


copyright 2011 [employer]- All Rights Reserved


 








Lower Extremity MRI  06/02/20 13:44


IMPRESSION:


 


1.  Moderate-sized suprapatellar joint effusion, which is


nonspecific but may be secondary to patellofemoral degenerative


sequela. However, if there is concern for septic joint, consider


arthrocentesis.


2.  No acute fracture, ligamentous or meniscal tear.


 














Assessment & Plan





- Diagnosis


(1) Effusion, right knee


Is this a current diagnosis for this admission?: Yes   


Plan: 


s/p arthrocentensis with improving functional level and pain. Continue 

supportive care.








(2) Arthropathy of right knee


Is this a current diagnosis for this admission?: Yes   


Plan: 


Continue current medication management








(3) Idiopathic gout, right knee


Qualifiers: 


   Chronicity: acute   Qualified Code(s): M10.061 - Idiopathic gout, right knee 

 


Is this a current diagnosis for this admission?: Yes   


Plan: 


Continue current medication management








(4) Essential (primary) hypertension


Is this a current diagnosis for this admission?: Yes   


Plan: 


Continue current medication management








- Time


Time Spent with patient: 25-34 minutes


Level of Care: MEDICAL


Medications reviewed and adjusted accordingly: Yes


Anticipated discharge: Home with Homehealth


Within: Other





- Inpatient Certification


Based on my medical assessment, after consideration of the patient's 

comorbidities, presenting symptoms, or acuity I expect that the services needed 

warrant INPATIENT care.: Yes


I certify that my determination is in accordance with my understanding of 

Medicare's requirements for reasonable and necessary INPATIENT services [42 CFR 

412.3e].: Yes


Medical Necessity: Significant Comorbidiites Make Outpatient Treatment Too 

Risky, Need Close Monitoring Due to Risk of Patient Decompensation, Need For 

Continuous Telemetry Monitoring, Need for Pain Control, Risk of Complication if 

Not Cared For in Hospital, Risk of Diagnosis Which Will Require Inpatient 

Eval/Care/Monitoring





- Plan Summary


Plan Summary: 





Continue current medication management

## 2020-06-07 RX ADMIN — ASPIRIN SCH MG: 81 TABLET, CHEWABLE ORAL at 09:08

## 2020-06-07 RX ADMIN — OXYCODONE AND ACETAMINOPHEN PRN TAB: 5; 325 TABLET ORAL at 21:18

## 2020-06-07 RX ADMIN — Medication SCH UNIT: at 21:06

## 2020-06-07 RX ADMIN — COLCHICINE SCH MG: 0.6 TABLET, FILM COATED ORAL at 17:07

## 2020-06-07 RX ADMIN — METOPROLOL TARTRATE SCH MG: 100 TABLET, FILM COATED ORAL at 21:06

## 2020-06-07 RX ADMIN — HYDRALAZINE HYDROCHLORIDE SCH MG: 50 TABLET, FILM COATED ORAL at 09:08

## 2020-06-07 RX ADMIN — RAMIPRIL SCH MG: 10 CAPSULE ORAL at 09:08

## 2020-06-07 RX ADMIN — Medication SCH UNIT: at 13:26

## 2020-06-07 RX ADMIN — METHYLPREDNISOLONE SODIUM SUCCINATE SCH MG: 40 INJECTION, POWDER, FOR SOLUTION INTRAMUSCULAR; INTRAVENOUS at 21:06

## 2020-06-07 RX ADMIN — METHYLPREDNISOLONE SODIUM SUCCINATE SCH MG: 40 INJECTION, POWDER, FOR SOLUTION INTRAMUSCULAR; INTRAVENOUS at 05:37

## 2020-06-07 RX ADMIN — METHYLPREDNISOLONE SODIUM SUCCINATE SCH MG: 40 INJECTION, POWDER, FOR SOLUTION INTRAMUSCULAR; INTRAVENOUS at 13:26

## 2020-06-07 RX ADMIN — Medication SCH UNIT: at 05:37

## 2020-06-07 RX ADMIN — METOPROLOL TARTRATE SCH MG: 100 TABLET, FILM COATED ORAL at 07:58

## 2020-06-07 RX ADMIN — COLCHICINE SCH MG: 0.6 TABLET, FILM COATED ORAL at 09:08

## 2020-06-07 RX ADMIN — HYDRALAZINE HYDROCHLORIDE SCH MG: 50 TABLET, FILM COATED ORAL at 17:07

## 2020-06-07 NOTE — PDOC PROGRESS REPORT
Subjective


Progress Note for:: 06/07/20


Subjective:: 





Patient reported participation in PT session today and self initiated bedside 

exercise routine. It was recommended that she use quad cane for support. No 

chest pain or difficulty with breathing. No nausea or vomiting. No fever or 

chills. 


Reason For Visit: 


ARTHROPATHY OF RIGHT KNEE








Physical Exam


Vital Signs: 


                                        











Temp Pulse Resp BP Pulse Ox


 


 98.2 F   61   16   132/79 H  98 


 


 06/07/20 11:51  06/07/20 11:51  06/07/20 11:51  06/07/20 11:51  06/07/20 11:51








                                 Intake & Output











 06/06/20 06/07/20 06/08/20





 06:59 06:59 06:59


 


Intake Total 990 600 240


 


Balance 990 600 240











Physical Exam: 


General appearance: PRESENT: no acute distress


Head exam: PRESENT: atraumatic, normocephalic


Eye exam: PRESENT: conjunctiva pink.  ABSENT: pallor, scleral icterus


Respiratory exam: PRESENT: clear to auscultation emilio


Cardiovascular exam: PRESENT: RRR, +S1, +S2.  ABSENT: diastolic murmur, rubs, 

systolic murmur


GI/Abdominal exam: PRESENT: normal bowel sounds, soft.  ABSENT: distended, 

guarding, mass, organomegaly, rebound, tenderness


Extremities exam: PRESENT: joint swelling - right knee joint but reportedly 

comparatively better.  ABSENT: pedal edema


Neurological exam: PRESENT: alert, awake, oriented to person, oriented to place,

oriented to time, oriented to situation, CN II-XII grossly intact.  ABSENT: 

motor sensory deficit


Psychiatric exam: PRESENT: appropriate affect, normal mood.  ABSENT: homicidal 

ideation, suicidal ideation


Skin exam: PRESENT: dry, warm





Results


Laboratory Results: 


                                        





                                 06/02/20 14:29 





                                 06/02/20 14:29 





                                        





06/02/20 14:29   Blood   Blood Culture - Final


                            NO GROWTH IN 5 DAYS


06/02/20 14:18   Blood   Blood Culture - Final


                            NO GROWTH IN 5 DAYS








Impressions: 


                                        





Chest X-Ray  06/02/20 00:00


IMPRESSION:


 


No acute disease.


 


Right IJ approach central venous catheter tip is located in the


lower SVC.


 


 


copyright 2011 [employer]- All Rights Reserved


 








Lower Extremity MRI  06/02/20 13:44


IMPRESSION:


 


1.  Moderate-sized suprapatellar joint effusion, which is


nonspecific but may be secondary to patellofemoral degenerative


sequela. However, if there is concern for septic joint, consider


arthrocentesis.


2.  No acute fracture, ligamentous or meniscal tear.


 














Assessment & Plan





- Diagnosis


(1) Effusion, right knee


Is this a current diagnosis for this admission?: Yes   





(2) Arthropathy of right knee


Is this a current diagnosis for this admission?: Yes   





(3) Idiopathic gout, right knee


Qualifiers: 


   Chronicity: acute   Qualified Code(s): M10.061 - Idiopathic gout, right knee 

 


Is this a current diagnosis for this admission?: Yes   





(4) Essential (primary) hypertension


Is this a current diagnosis for this admission?: Yes   





- Time


Time Spent with patient: 25-34 minutes


Level of Care: MEDICAL


Medications reviewed and adjusted accordingly: Yes


Anticipated discharge: Home with Homehealth


Within: Other





- Inpatient Certification


Based on my medical assessment, after consideration of the patient's 

comorbidities, presenting symptoms, or acuity I expect that the services needed 

warrant INPATIENT care.: Yes


I certify that my determination is in accordance with my understanding of 

Medicare's requirements for reasonable and necessary INPATIENT services [42 CFR 

412.3e].: Yes


Medical Necessity: Significant Comorbidiites Make Outpatient Treatment Too 

Risky, Need Close Monitoring Due to Risk of Patient Decompensation, Risk of 

Complication if Not Cared For in Hospital, Risk of Diagnosis Which Will Require 

Inpatient Eval/Care/Monitoring


Post Hospital Care: D/C Planner Documentation





- Plan Summary


Plan Summary: 





Continue current medication management.

## 2020-06-08 LAB
CK MB SERPL-MCNC: 0.98 NG/ML (ref ?–4.55)
TROPONIN I SERPL-MCNC: < 0.012 NG/ML

## 2020-06-08 RX ADMIN — Medication SCH UNIT: at 13:11

## 2020-06-08 RX ADMIN — Medication SCH UNIT: at 21:48

## 2020-06-08 RX ADMIN — RAMIPRIL SCH MG: 10 CAPSULE ORAL at 09:37

## 2020-06-08 RX ADMIN — METOPROLOL TARTRATE SCH MG: 100 TABLET, FILM COATED ORAL at 21:47

## 2020-06-08 RX ADMIN — METHYLPREDNISOLONE SODIUM SUCCINATE SCH MG: 40 INJECTION, POWDER, FOR SOLUTION INTRAMUSCULAR; INTRAVENOUS at 05:33

## 2020-06-08 RX ADMIN — HYDRALAZINE HYDROCHLORIDE SCH MG: 50 TABLET, FILM COATED ORAL at 09:37

## 2020-06-08 RX ADMIN — METHYLPREDNISOLONE SODIUM SUCCINATE SCH MG: 40 INJECTION, POWDER, FOR SOLUTION INTRAMUSCULAR; INTRAVENOUS at 13:12

## 2020-06-08 RX ADMIN — METHYLPREDNISOLONE SODIUM SUCCINATE SCH MG: 40 INJECTION, POWDER, FOR SOLUTION INTRAMUSCULAR; INTRAVENOUS at 21:48

## 2020-06-08 RX ADMIN — HYDRALAZINE HYDROCHLORIDE SCH MG: 50 TABLET, FILM COATED ORAL at 17:12

## 2020-06-08 RX ADMIN — Medication SCH UNIT: at 05:33

## 2020-06-08 RX ADMIN — ASPIRIN SCH MG: 81 TABLET, CHEWABLE ORAL at 09:37

## 2020-06-08 RX ADMIN — METOPROLOL TARTRATE SCH MG: 100 TABLET, FILM COATED ORAL at 07:54

## 2020-06-08 RX ADMIN — OXYCODONE AND ACETAMINOPHEN PRN TAB: 5; 325 TABLET ORAL at 13:11

## 2020-06-08 RX ADMIN — COLCHICINE SCH MG: 0.6 TABLET, FILM COATED ORAL at 17:12

## 2020-06-08 RX ADMIN — COLCHICINE SCH MG: 0.6 TABLET, FILM COATED ORAL at 09:37

## 2020-06-08 RX ADMIN — METOPROLOL TARTRATE SCH: 100 TABLET, FILM COATED ORAL at 21:23

## 2020-06-08 NOTE — PDOC PROGRESS REPORT
Subjective


Progress Note for:: 06/08/20


Subjective:: 





Patient complaining of sensation of chest pressure, a 12-lead EKG was done, it 

was sinus rhythm, Q waves in V1 V2, no different from 2015 EKG


Reason For Visit: 


ARTHROPATHY OF RIGHT KNEE








Physical Exam


Vital Signs: 


                                        











Temp Pulse Resp BP Pulse Ox


 


 97.7 F   48 L  12   186/94 H  100 


 


 06/08/20 21:16  06/08/20 21:16  06/08/20 21:16  06/08/20 21:16  06/08/20 21:16








                                 Intake & Output











 06/07/20 06/08/20 06/09/20





 06:59 06:59 06:59


 


Intake Total 600 720 600


 


Balance 600 720 600











General appearance: PRESENT: no acute distress, well-developed, well-nourished


Head exam: PRESENT: atraumatic, normocephalic


Eye exam: PRESENT: conjunctiva pink, EOMI, PERRLA


Ear exam: PRESENT: normal external ear exam


Mouth exam: PRESENT: moist, tongue midline


Neck exam: PRESENT: full ROM


Respiratory exam: PRESENT: clear to auscultation emilio


Cardiovascular exam: PRESENT: RRR, +S1, +S2


Pulses: PRESENT: normal dorsalis pedis pul, +2 pedal pulses bilateral


Vascular exam: PRESENT: normal capillary refill


GI/Abdominal exam: PRESENT: normal bowel sounds, soft


Rectal exam: PRESENT: deferred


Neurological exam: PRESENT: alert, awake, oriented to person, oriented to place,

oriented to time, oriented to situation, CN II-XII grossly intact


Psychiatric exam: PRESENT: appropriate affect, normal mood


Skin exam: PRESENT: dry, intact, warm





Results


Laboratory Results: 


                                        





                                 06/02/20 14:29 





                                 06/02/20 14:29 





                                        











  06/08/20 06/08/20





  18:41 18:41


 


Creatine Kinase  45 


 


CK-MB (CK-2)   0.98


 


Troponin I   < 0.012











Impressions: 


                                        





Chest X-Ray  06/02/20 00:00


IMPRESSION:


 


No acute disease.


 


Right IJ approach central venous catheter tip is located in the


lower SVC.


 


 


copyright 2011 [employer]- All Rights Reserved


 








Lower Extremity MRI  06/02/20 13:44


IMPRESSION:


 


1.  Moderate-sized suprapatellar joint effusion, which is


nonspecific but may be secondary to patellofemoral degenerative


sequela. However, if there is concern for septic joint, consider


arthrocentesis.


2.  No acute fracture, ligamentous or meniscal tear.


 














Assessment & Plan





- Diagnosis


(1) Arthropathy of right knee


Is this a current diagnosis for this admission?: Yes   


Plan: 


Reduce Solu-Medrol dose








(2) Essential (primary) hypertension


Is this a current diagnosis for this admission?: Yes   





(3) Chest pain


Qualifiers: 


   Chest pain type: unspecified   Qualified Code(s): R07.9 - Chest pain, 

unspecified   


Is this a current diagnosis for this admission?: Yes   


Plan: 


Cardiac enzymes every 8 hours x3, Continue antiplatelet, This could be GI 

related patient is on Solu-Medrol, reduce Solu-Medrol dose








- Time


Time Spent with patient: 25-34 minutes


Level of Care: IMCU

## 2020-06-09 VITALS — DIASTOLIC BLOOD PRESSURE: 72 MMHG | SYSTOLIC BLOOD PRESSURE: 156 MMHG

## 2020-06-09 LAB
CK MB SERPL-MCNC: 0.83 NG/ML (ref ?–4.55)
CK MB SERPL-MCNC: 1.03 NG/ML (ref ?–4.55)
TROPONIN I SERPL-MCNC: < 0.012 NG/ML
TROPONIN I SERPL-MCNC: < 0.012 NG/ML

## 2020-06-09 RX ADMIN — RAMIPRIL SCH MG: 10 CAPSULE ORAL at 09:33

## 2020-06-09 RX ADMIN — METOPROLOL TARTRATE SCH MG: 100 TABLET, FILM COATED ORAL at 07:02

## 2020-06-09 RX ADMIN — Medication SCH UNIT: at 17:30

## 2020-06-09 RX ADMIN — COLCHICINE SCH MG: 0.6 TABLET, FILM COATED ORAL at 17:30

## 2020-06-09 RX ADMIN — COLCHICINE SCH MG: 0.6 TABLET, FILM COATED ORAL at 09:33

## 2020-06-09 RX ADMIN — ASPIRIN SCH MG: 81 TABLET, CHEWABLE ORAL at 09:34

## 2020-06-09 RX ADMIN — Medication SCH UNIT: at 06:16

## 2020-06-09 RX ADMIN — HYDRALAZINE HYDROCHLORIDE SCH MG: 50 TABLET, FILM COATED ORAL at 09:34

## 2020-06-09 RX ADMIN — METHYLPREDNISOLONE SODIUM SUCCINATE SCH MG: 40 INJECTION, POWDER, FOR SOLUTION INTRAMUSCULAR; INTRAVENOUS at 06:16

## 2020-06-09 RX ADMIN — METHYLPREDNISOLONE SODIUM SUCCINATE SCH MG: 40 INJECTION, POWDER, FOR SOLUTION INTRAMUSCULAR; INTRAVENOUS at 17:28

## 2020-06-09 RX ADMIN — HYDRALAZINE HYDROCHLORIDE SCH MG: 50 TABLET, FILM COATED ORAL at 17:30

## 2020-06-09 NOTE — EKG REPORT
SEVERITY:- ABNORMAL ECG -

SINUS RHYTHM

PROBABLE LEFT ATRIAL ABNORMALITY

LVH WITH SECONDARY REPOLARIZATION ABNORMALITY

ANTERIOR Q WAVES, POSSIBLY DUE TO LVH

:

Confirmed by: Li Chawla MD 09-Jun-2020 09:28:51

## 2020-06-09 NOTE — PDOC DISCHARGE SUMMARY
Impression





- Admit/DC Date/PCP


Admission Date/Primary Care Provider: 


  06/05/20 11:31





  HOLGER SKAGGS MD





Discharge Date: 06/09/20





- Discharge Diagnosis


(1) Arthropathy of right knee


Is this a current diagnosis for this admission?: Yes   





(2) Essential (primary) hypertension


Is this a current diagnosis for this admission?: Yes   





(3) Chest pain


Is this a current diagnosis for this admission?: Yes   





(4) Idiopathic gout, right knee


Is this a current diagnosis for this admission?: Yes   





- Additional Information


Resuscitation Status: Full Code


Discharge Diet: As Tolerated


Referrals: 


HOLGER SKAGGS MD [Primary Care Provider] - 06/16/20 10:00 am


Home Medications: 








Hydralazine HCl [Apresoline 50 mg Tablet] 50 mg PO BID 02/11/12 


Hydrochlorothiazide [Hydrodiuril 25 mg Tablet] 25 mg PO QAM 02/11/12 


Metoprolol Tartrate 100 mg PO BID 02/11/12 


Ramipril [Altace 10 mg Capsule] 10 mg PO DAILY 02/11/12 


Allopurinol [Zyloprim 100 mg Tablet] 1 tab PO BID 05/30/20 


Aspirin 81 mg PO DAILY 05/30/20 


Pravastatin Sodium 40 mg PO QPM 05/30/20 


Colchicine [Colcrys 0.6 mg Tablet] 0.6 mg PO BID  tablet 06/09/20 











History of Present Illiness


History of Present Illness: 


JYOTI HILL is a 75 year old female,she has a history of osteoarthritis of 

the knees, she came to the office for evaluation of severe pain, swelling of the

right knee, she cannot bear weight, she was in a wheelchair.  She said the pain 

is excruciating.  She was in the emergency room on May 31, 2020 for evaluation 

of the same knee.  At the time she underwent arthrocentesis, I reviewed the 

findings of the synovial fluid was slightly viscous the fluid WBC was 29,700 

predominantly neutrophils no crystals was observed but there was synovial 

monosodium urate both intra-and extracellularly suggesting that she probably 

have a superimposed gout on a pre-existing  osteoarthritis.Patient was admitted 

because he cannot bear weight, MRI of the knee The anterior and posterior 

cruciate ligaments are intact the medial, lateral collateral ligamentous 

complexes and insertion of the popliteus tendon.  There is no meniscal tear.  

The capsular attachments are intact.  The extensor mechanisms is maintained.  

There is moderate sized suprapatellar joint effusion, there is also diffuse 

subcutaneous edema.  There is no acute fracture or osteonecrosis there is 

chondral thinning of the medial lateral tibiofemoral compartment.  There is 

multifocal fissuring and chondral loss along the patellar articular cartilage 

and the opposing femoral trochlear.  There is large superior patellar spur 

noted.  Subchondral cystic changes are seen.








Hospital Course


Hospital Course: 


Patient was admitted for the management of acute arthropathy of the right knee, 

she was treated with intravenous Solu-Medrol, patient could not bear weight, she

was seen in consultation by orthopedic, on admission she has tremendous swelling

of the right knee, she was discharged home today that was complete resolution of

the swelling.  She has underlining osteoarthritis of the knee, MRI of the knee 

was done, it demonstrated significant inflammation.She had episode of chest 

pressure yesterday, a 12-lead EKG was done, it was sinus rhythm, there was Q 

waves in precordial leads V1 and V2, this is no change from 2015 over 5 years 

ago,Cardiac enzymes, 3 sets negative for acute MI patient is chest pain-free 

today she feels much better, it was felt that the chest pain is probably due to 

gastritis from the intravenous Solu-Medrol.





Physical Exam


Vital Signs: 


                                        











Temp Pulse Resp BP Pulse Ox


 


 98.1 F   54 L  16   156/72 H  100 


 


 06/09/20 19:19  06/09/20 19:19  06/09/20 19:19  06/09/20 19:19  06/09/20 19:19








                                 Intake & Output











 06/08/20 06/09/20 06/10/20





 06:59 06:59 06:59


 


Intake Total 720 600 560


 


Balance 720 600 560











General appearance: PRESENT: no acute distress


Eye exam: PRESENT: PERRLA


Respiratory exam: PRESENT: clear to auscultation emilio


Cardiovascular exam: PRESENT: +S1, +S2


GI/Abdominal exam: PRESENT: soft


Neurological exam: PRESENT: alert, CN II-XII grossly intact





Results


Laboratory Results: 


                                        











WBC  9.5 10^3/uL (4.0-10.5)   06/02/20  14:29    


 


RBC  3.84 10^6/uL (3.72-5.28)   06/02/20  14:29    


 


Hgb  12.3 g/dL (12.0-15.5)   06/02/20  14:29    


 


Hct  35.7 % (36.0-47.0)  L  06/02/20  14:29    


 


MCV  93 fl (80-97)   06/02/20  14:29    


 


MCH  32.1 pg (27.0-33.4)   06/02/20  14:29    


 


MCHC  34.5 g/dL (32.0-36.0)   06/02/20  14:29    


 


RDW  13.3 % (11.5-14.0)   06/02/20  14:29    


 


Plt Count  303 10^3/uL (150-450)   06/02/20  14:29    


 


Sodium  136.3 mmol/L (137-145)  L  06/02/20  14:29    


 


Potassium  3.8 mmol/L (3.6-5.0)   06/02/20  14:29    


 


Chloride  98 mmol/L ()   06/02/20  14:29    


 


Carbon Dioxide  28 mmol/L (22-30)   06/02/20  14:29    


 


Anion Gap  10  (5-19)   06/02/20  14:29    


 


BUN  19 mg/dL (7-20)   06/02/20  14:29    


 


Creatinine  1.16 mg/dL (0.52-1.25)   06/02/20  14:29    


 


Est GFR ( Amer)  55  (>60)  L  06/02/20  14:29    


 


Est GFR (MDRD) Non-Af  46  (>60)  L  06/02/20  14:29    


 


Glucose  121 mg/dL ()  H  06/02/20  14:29    


 


Calcium  10.1 mg/dL (8.4-10.2)   06/02/20  14:29    


 


Total Bilirubin  0.7 mg/dL (0.2-1.3)   06/02/20  14:29    


 


Direct Bilirubin  0.1 mg/dL (0.0-0.4)   06/02/20  14:29    


 


Neonat Total Bilirubin  Not Reportable   06/02/20  14:29    


 


Neonat Direct Bilirubin  Not Reportable   06/02/20  14:29    


 


Neonat Indirect Bili  Not Reportable   06/02/20  14:29    


 


AST  23 U/L (14-36)   06/02/20  14:29    


 


ALT  13 U/L (<35)   06/02/20  14:29    


 


Alkaline Phosphatase  73 U/L ()   06/02/20  14:29    


 


Creatine Kinase  43 U/L ()   06/09/20  06:25    


 


CK-MB (CK-2)  0.83 ng/mL (<4.55)   06/09/20  06:25    


 


Troponin I  < 0.012 ng/mL  06/09/20  06:25    


 


Total Protein  7.5 g/dL (6.3-8.2)   06/02/20  14:29    


 


Albumin  4.0 g/dL (3.5-5.0)   06/02/20  14:29    


 


Urine Color  YELLOW   06/02/20  14:40    


 


Urine Appearance  SLIGHTLY-CLOUDY   06/02/20  14:40    


 


Urine pH  6.0  (5.0-9.0)   06/02/20  14:40    


 


Ur Specific Gravity  1.017   06/02/20  14:40    


 


Urine Protein  30 mg/dL (NEGATIVE)  H  06/02/20  14:40    


 


Urine Glucose (UA)  NEGATIVE mg/dL (NEGATIVE)   06/02/20  14:40    


 


Urine Ketones  TRACE mg/dL (NEGATIVE)  H  06/02/20  14:40    


 


Urine Blood  NEGATIVE  (NEGATIVE)   06/02/20  14:40    


 


Urine Nitrite  NEGATIVE  (NEGATIVE)   06/02/20  14:40    


 


Urine Bilirubin  NEGATIVE  (NEGATIVE)   06/02/20  14:40    


 


Urine Urobilinogen  NEGATIVE mg/dL (<2.0)   06/02/20  14:40    


 


Ur Leukocyte Esterase  NEGATIVE  (NEGATIVE)   06/02/20  14:40    


 


Urine WBC (Auto)  1 /HPF  06/02/20  14:40    


 


Urine RBC (Auto)  1 /HPF  06/02/20  14:40    


 


Squamous Epi Cells Auto  8 /HPF  06/02/20  14:40    


 


Urine Mucus (Auto)  RARE /LPF  06/02/20  14:40    


 


Urine Ascorbic Acid  NEGATIVE  (NEGATIVE)   06/02/20  14:40    








                                        











  06/08/20 06/09/20 06/09/20





  18:41 00:50 06:25


 


CK-MB (CK-2)  0.98  1.03  0.83


 


Troponin I  < 0.012  < 0.012  < 0.012











Impressions: 


                                        





Chest X-Ray  06/02/20 00:00


IMPRESSION:


 


No acute disease.


 


Right IJ approach central venous catheter tip is located in the


lower SVC.


 


 


copyright 2011 [employer]- All Rights Reserved


 








Lower Extremity MRI  06/02/20 13:44


IMPRESSION:


 


1.  Moderate-sized suprapatellar joint effusion, which is


nonspecific but may be secondary to patellofemoral degenerative


sequela. However, if there is concern for septic joint, consider


arthrocentesis.


2.  No acute fracture, ligamentous or meniscal tear.


 














Stroke


Is this a Stroke Patient?: No





Acute Heart Failure





- **


Is this a Heart Failure Patient?: No

## 2020-10-20 ENCOUNTER — HOSPITAL ENCOUNTER (OUTPATIENT)
Dept: HOSPITAL 62 - OROUT | Age: 75
Discharge: HOME | End: 2020-10-20
Attending: ORTHOPAEDIC SURGERY
Payer: COMMERCIAL

## 2020-10-20 VITALS — SYSTOLIC BLOOD PRESSURE: 160 MMHG | DIASTOLIC BLOOD PRESSURE: 96 MMHG

## 2020-10-20 DIAGNOSIS — I10: ICD-10-CM

## 2020-10-20 DIAGNOSIS — G56.01: Primary | ICD-10-CM

## 2020-10-20 DIAGNOSIS — Z03.818: ICD-10-CM

## 2020-10-20 DIAGNOSIS — M10.9: ICD-10-CM

## 2020-10-20 DIAGNOSIS — Z79.899: ICD-10-CM

## 2020-10-20 PROCEDURE — 29848 WRIST ENDOSCOPY/SURGERY: CPT

## 2020-10-20 PROCEDURE — C9803 HOPD COVID-19 SPEC COLLECT: HCPCS

## 2020-10-20 PROCEDURE — 01810 ANES PX NRV MUSC F/ARM WRST: CPT

## 2020-10-20 PROCEDURE — 87635 SARS-COV-2 COVID-19 AMP PRB: CPT

## 2020-10-20 NOTE — DISCHARGE SUMMARY
Discharge Summary (SDC)





- Discharge


Final Diagnosis: 





Right carpal tunnel syndrome


Date of Surgery: 10/20/20


Discharge Date: 10/20/20


Condition: Good


Treatment or Instructions: 


Schedule Follow Up w/ Dr. Den Schmid @ [facility] to be seen 

in 10-14 days or as scheduled





[place]: [phone] 


[place]: [phone]


[place]: [phone] 





May remove dressing on postop day #3, keep incision covered and dry.





Ice and elevate





May begin finger range of motion attempting to make full fist.





Stool softener of choice when on pain medication.





USE OF OVER-THE-COUNTER IBUPROFEN:


     Ibuprofen (Advil, Nuprin, Medipren, Motrin IB) is a medication for fever 

and pain control.  In addition, it has anti- inflammatory effects which may be 

beneficial, especially in the treatment of injuries.


     It's best to take ibuprofen with food.  Persons with ulcer disease or 

allergy to aspirin should notify their physician of this before taking 

ibuprofen.


     Ibuprofen can be given every four to six hours, for a total of four doses 

daily.


     Age              Pain or fever dose          Antiinflammatory dose


     6-8 yr              200 mg (1 tab)                200 mg (1 tab)


     9-11 yr             200 mg (1 tab)                200-400 mg (1-2 tab)


     11-14 yr            200-400 mg (1-2 tab)         400 mg (2 tab)


     15-adult            400 mg (2 tab)                600 mg (3 tab)








ORAL NARCOTIC MEDICATION:


     You have been given a prescription for pain control.  This medication is a 

narcotic.  It's best taken with food, as nausea can result if taken on an empty 

stomach.


     Don't operate machinery or drive within six hours of taking this 

medication.  Do not combine this medicine with alcohol, or with any medication 

which can cause sedation (such as cold tablets or sleeping pills) unless you get

permission from the physician.


     Narcotics tend to cause constipation.  If possible, drink plenty of fluids 

and eat a diet high in fiber and fruits.





     Please be aware that prescription narcotics also have the potential for 

abuse.  People become addicted to these medications because of the general sense

of wellbeing that they induce.  This feeling along with a significant reduction 

in tension, anxiety, and aggression provides a stimulating seductive quality to 

these drugs.  Once your pain is under control, we encourage you to discard your 

unused narcotics.





Prescriptions: 


Hydrocodone/Acetaminophen [Norco 5-325 mg Tablet] 1 tab PO Q6 PRN #10 tablet


 PRN Reason: 


Referrals: 


HOLGER SKAGGS MD [Primary Care Provider] - 


Discharge Diet: As Tolerated


Respiratory Treatments at Home: Deep Breathing/Coughing


Discharge Activity: No Lifting Over 10 Pounds, No Lifting/Push/Pulling


Report the Following to Your Physician Immediately: Fever over 101 Degrees, 

Unusual Bleeding, Redness, Swelling, Warmth, Increased Soreness

## 2020-10-20 NOTE — OPERATIVE REPORT
Operative Report


DATE OF SURGERY: 10/20/20


PREOPERATIVE DIAGNOSIS: Right Carpal Tunnel Syndrome


POSTOPERATIVE DIAGNOSIS: Same


OPERATION: Right Endoscopic Carpal Tunnel Release


SURGEON: KEVAN HIDALGO


ANESTHESIA: LMAC


COMPLICATIONS: 





None


ESTIMATED BLOOD LOSS: Minimal


PROCEDURE: 





Indication for above procedure:





75-year-old female with longstanding history of numbness and tingling along 

median nerve distribution.  Patient had electrodiagnostic testing confirming 

severe carpal tunnel syndrome with complete sensory block.  Given severity we 

discussed treatment options and decision was made to proceed with operative 

intervention.  Risk and benefits were explained patient verbalized understanding

consented for surgical procedure.





Procedure In Detail:





Patient was seen and evaluated in the preoperative holding area.  The RIGHT 

upper extremity was initialized and marked.  Patient received Ancef IV for 

bacterial prophylaxis.  Patient was taken back to the operative room where 

transferred operative table.  Patient was then placed under MAC anesthesia.  

Once adequately anesthetized, a nonsterile tourniquet was placed on the upper 

extremity.  A surgical team debriefing was performed ensuring all 

instrumentation was available, the surgical procedure was discussed with 

possible concerns reviewed.  Skin was prepped with alcohol and 10cc of 1% 

lidocaine without epinephrine was injected locally and w/in carpal canal.  The 

upper extremity was prepped with chlorhexidine and alcohol and draped in a 

sterile fashion.   A timeout was done identifying correct patient, procedure and

extremity everyone in attendance agree with this and verbalized no concerns.The 

extremity was then exsanguinated the tourniquet was inflated to 250 mmHg.





A transverse skin incision was made just proximal to the wrist flexion crease 

ulnar to the palmaris longus.  Blunt dissection was performed down to the 

palmaris longus tendon which was retracted radially.  Deep to the palmaris 

longus tendon was the volar carpal ligament this was incised identifying the 

median nerve deep.  With the use of a [place] elevator any soft tissue/synovium 

was freed from the undersurface of the transverse carpal ligament. The hook of 

hamate was identified ulnarly.  The [employer] cannulas were then introduced 

beginning with #1 progressing to a #3 gently dilating the carpal canal.  I then 

introduced the scope within the cannula and identified transverse carpal 

ligament ensuring the median nerve was not visualized within the cannula.  I 

triangulated distally with a 25-gauge needle identifying the distal aspect of 

the transverse carpal ligament, to ensure protection of the superficial palmar 

arch.  The arthroscopic knife was used to incise the transverse carpal ligament 

under direct visualization with the arthroscopic camera.  Any excess transverse 

fibers that remained after the first past were carefully released with a repeat 

pass.  The median nerve was then directly visualized radially without 

disruption.  Once this was completed I placed the #3 dilator and assured I got 

complete release of the transverse carpal ligament without residual compression.

 The median nerve was directly visualized and free of any overlying compression.

 I then turned my attention to release of the volar antebrachial fascia 

proximally.  Once again a [place] was used to open the wound and I proceeded with 

cannula #1 to #3.  The arthroscope was introduced into the cannula and under 

direct visualization the volar antebrachial fascia was released.  Once this was 

complete I copiusly irrigated the wound with normal saline.  The skin incision 

was closed with 4-0  Monocryl subcutaneous and a running subcuticular 4-0 

Monocryl.  This was reinforced with Dermabond and Steri-Strips.  Sterile, 4 x 

4's and a Ace bandage was placed loosely.  Sponge counts, instrument counts and 

needle counts were correct.  The was no intraoperative complications patient 

tolerated the procedure well and was stable to PACU.